# Patient Record
Sex: FEMALE | Race: WHITE | NOT HISPANIC OR LATINO | Employment: UNEMPLOYED | URBAN - METROPOLITAN AREA
[De-identification: names, ages, dates, MRNs, and addresses within clinical notes are randomized per-mention and may not be internally consistent; named-entity substitution may affect disease eponyms.]

---

## 2017-03-20 ENCOUNTER — TELEPHONE (OUTPATIENT)
Dept: ORTHOPEDIC SURGERY | Facility: CLINIC | Age: 57
End: 2017-03-20

## 2017-03-20 DIAGNOSIS — IMO0002 PROPHYLACTIC ANTIBIOTIC FOR DENTAL PROCEDURE INDICATED DUE TO PRIOR JOINT REPLACEMENT: Primary | ICD-10-CM

## 2017-03-20 RX ORDER — CEPHALEXIN 500 MG/1
CAPSULE ORAL
Qty: 4 CAPSULE | Refills: 2 | Status: SHIPPED | OUTPATIENT
Start: 2017-03-20 | End: 2017-08-17 | Stop reason: SDUPTHER

## 2017-03-20 NOTE — TELEPHONE ENCOUNTER
Have E prescribed a new prescription to [facility] at [phone] for Keflex 500 mg, #4,Take 4 caps 1 hour prior to Dental procedure.  Refill ×2 per [facility]

## 2017-03-21 ENCOUNTER — TELEPHONE (OUTPATIENT)
Dept: ORTHOPEDIC SURGERY | Facility: CLINIC | Age: 57
End: 2017-03-21

## 2017-03-21 NOTE — TELEPHONE ENCOUNTER
Her dentist will need to be the one that determines if she needs antibiotics to take between now and her appt. Does still need to take the prophylactic antibiotic RX 1 hour prior to visit.  AMB

## 2017-05-08 ENCOUNTER — TELEPHONE (OUTPATIENT)
Dept: ORTHOPEDIC SURGERY | Facility: CLINIC | Age: 57
End: 2017-05-08

## 2017-06-29 ENCOUNTER — OFFICE VISIT (OUTPATIENT)
Dept: ORTHOPEDIC SURGERY | Facility: CLINIC | Age: 57
End: 2017-06-29

## 2017-06-29 VITALS — WEIGHT: 140 LBS | BODY MASS INDEX: 26.43 KG/M2 | HEIGHT: 61 IN | TEMPERATURE: 97.4 F

## 2017-06-29 DIAGNOSIS — G89.29 CHRONIC PAIN OF LEFT KNEE: Primary | ICD-10-CM

## 2017-06-29 DIAGNOSIS — M25.562 CHRONIC PAIN OF LEFT KNEE: Primary | ICD-10-CM

## 2017-06-29 DIAGNOSIS — M17.12 PRIMARY OSTEOARTHRITIS OF LEFT KNEE: ICD-10-CM

## 2017-06-29 PROCEDURE — 73562 X-RAY EXAM OF KNEE 3: CPT | Performed by: ORTHOPAEDIC SURGERY

## 2017-06-29 PROCEDURE — 99214 OFFICE O/P EST MOD 30 MIN: CPT | Performed by: ORTHOPAEDIC SURGERY

## 2017-06-29 RX ORDER — CEFAZOLIN SODIUM 2 G/100ML
2 INJECTION, SOLUTION INTRAVENOUS ONCE
Status: CANCELLED | OUTPATIENT
Start: 2017-08-25 | End: 2017-06-29

## 2017-06-29 RX ORDER — MELOXICAM 7.5 MG/1
15 TABLET ORAL ONCE
Status: CANCELLED | OUTPATIENT
Start: 2017-08-25 | End: 2017-06-29

## 2017-06-29 RX ORDER — PREGABALIN 75 MG/1
150 CAPSULE ORAL ONCE
Status: CANCELLED | OUTPATIENT
Start: 2017-08-25 | End: 2017-06-29

## 2017-06-29 RX ORDER — VANCOMYCIN HYDROCHLORIDE 1 G/200ML
15 INJECTION, SOLUTION INTRAVENOUS ONCE
Status: CANCELLED | OUTPATIENT
Start: 2017-08-25 | End: 2017-06-29

## 2017-06-29 NOTE — PROGRESS NOTES
Patient: Yeny Anthony  YOB: 1960 56 y.o. female  Medical Record Number: [id number]    Chief Complaints:   Chief Complaint   Patient presents with   • Left Knee - Pain, Follow-up       History of Present Illness:Yeny Anthony is a 56 y.o. female who presents for follow-up of  Left medial knee pain which she rates as severe and constant.  It is a stabbing aching pain worse with weightbearing.  Has had previous injections and physical therapy as well as taking anti-inflammatories without any symptoms.  Only walk short distances and has limitations of basic activities of daily living.    Allergies:   Allergies   Allergen Reactions   • Adhesive Tape Itching   • Codeine Itching       Medications:   Current Outpatient Prescriptions   Medication Sig Dispense Refill   • ALLEGRA-D ALLERGY & CONGESTION 180-240 MG per 24 hr tablet TK 1 T PO D  1   • buPROPion SR (WELLBUTRIN SR) 150 MG 12 hr tablet Take 150 mg by mouth 2 (two) times a day as needed.     • calcium carbonate-vitamin d 600-400 MG-UNIT per tablet TK 1 T PO BID  3   • cephalexin (KEFLEX) 500 MG capsule Take all 4 capsules 1 hour prior to procedure 4 capsule 2   • cephalexin (KEFLEX) 500 MG capsule Take all 4 caps 1 hour prior to procedure 4 capsule 2   • docusate sodium 100 MG capsule Take 100 mg by mouth 2 (two) times a day as needed for constipation for up to 30 doses. 30 capsule 0   • HYDROcodone-acetaminophen (NORCO) 7.5-325 MG per tablet Take 1-2 po q 4-6 hr prn pain 75 tablet 0   • pantoprazole (PROTONIX) 40 MG EC tablet Take 40 mg by mouth every morning.  1   • pravastatin (PRAVACHOL) 10 MG tablet 10 mg po nightly  1     No current facility-administered medications for this visit.          The following portions of the patient's history were reviewed and updated as appropriate: allergies, current medications, past family history, past medical history, past social history, past surgical history and problem list.    Review of Systems:  "  A 14 point review of systems was performed. All systems negative except pertinent positives/negative listed in HPI above    Physical Exam:   Vitals:    06/29/17 1546   Temp: 97.4 °F (36.3 °C)   Weight: 140 lb (63.5 kg)   Height: 61\" (154.9 cm)       General: A and O x 3, ASA, NAD    SCLERA:    Normal    DENTITION:   Normal  Knee:  left    ALIGNMENT:     Varus  ,   Patella  tracks  midline without crepitance    GAIT:    Antalgic    SKIN:    No abnormality    RANGE OF MOTION:   3  -  125   DEG    STRENGTH:   4  / 5    LIGAMENTS:    No varus / valgus instability.   Negative  Lachman.    MENISCUS:     Negative   Darren       DISTAL PULSES:    Paplable    DISTAL SENSATION :   Intact    LYMPHATICS:     No   lymphadenopathy    OTHER:          - Positive   effusion      - Crepitance with ROM       Radiology:  Xrays 3views left knee (ap,lateral, sunrise) were ordered and reviewed for evaluation of knee pain demonstratingadvanced varus osteoarthritis with bone on bone articulation, subchondral cysts, and periarticular osteophytes- isolated to medial compartment  todays xrays were compared to previous xrays and show new findings as described above    Assessment/Plan:  Left knee isolated medial compartment end-stage osteoarthritis.  She has failed the full course of conservative measures.  She is undergone right knee unicompartmental replacement with excellent success.  Continuation of conservative management vs. UKA vs TKA discussed. After stating understanding of the procedures and associated risks / benefit / alternatives of the various options,  the patient wishes to proceed with unicompartmental knee replacement.  At this point the patient has failed the full gamut of conservative treatment and stating complete understanding of the risks / benefits / anternatives wishes to proceed with surgical treatment.    The patient understands that no guarantees have been made with regard to outcomes of this procedure and that " there is a possibility that future surgery is a possibility including conversion to total knee replacement should further disease progression occur in other compartments of the knee.    Risk and benefits of surgery were reviewed.  Including, but not limited to, blood clots or pulmonary embolism, anesthesia risk, infection, fracture, skin/leg numbness, persistent pain/crepitance/popping/catching, failure of the implant, need for future surgeries, hematoma, possible nerve or blood vessel injury, need for transfusion, and potential risk of stroke,heart attack or death, among others.  The patient understands and wishes to proceed.     It was explained that if tissue has been repaired or reconstructed, there is also an increased chance of failure which may require further management.  Following the completion of the discussion, the patient expressed understanding of this planned course of care, all their questions were answered and consent will be obtained preoperatively.    Operative Plan:  Left [facility] and Nephew/ Leanna unicompartmental knee replacement performing the procedure on an outpatient basiswith home health rehab

## 2017-07-06 ENCOUNTER — TELEPHONE (OUTPATIENT)
Dept: ORTHOPEDIC SURGERY | Facility: CLINIC | Age: 57
End: 2017-07-06

## 2017-07-06 NOTE — TELEPHONE ENCOUNTER
----- Message from Yeny Anthony sent at 7/5/2017  8:54 PM EDT -----  Regarding: Visit Follow-Up Question    Contact: [phone]  MY CHART MESSAGE:    Hello  I need to schedule my uni compartmental knee replacement for my left knee this coming August somewhere around the 21st. Can I set that up as well as my pre surgical visit?

## 2017-07-07 NOTE — TELEPHONE ENCOUNTER
It looks like a case request was placed on 6/29/17.  Has this been taken care of?  Has she been called to set up surgery and her presurgical visit?

## 2017-08-14 ENCOUNTER — APPOINTMENT (OUTPATIENT)
Dept: PREADMISSION TESTING | Facility: HOSPITAL | Age: 57
End: 2017-08-14

## 2017-08-17 ENCOUNTER — OFFICE VISIT (OUTPATIENT)
Dept: ORTHOPEDIC SURGERY | Facility: CLINIC | Age: 57
End: 2017-08-17

## 2017-08-17 ENCOUNTER — APPOINTMENT (OUTPATIENT)
Dept: PREADMISSION TESTING | Facility: HOSPITAL | Age: 57
End: 2017-08-17

## 2017-08-17 VITALS
DIASTOLIC BLOOD PRESSURE: 75 MMHG | TEMPERATURE: 98.2 F | RESPIRATION RATE: 16 BRPM | SYSTOLIC BLOOD PRESSURE: 121 MMHG | OXYGEN SATURATION: 99 % | WEIGHT: 145 LBS | HEART RATE: 78 BPM | HEIGHT: 61 IN | BODY MASS INDEX: 27.38 KG/M2

## 2017-08-17 VITALS
SYSTOLIC BLOOD PRESSURE: 140 MMHG | HEIGHT: 61 IN | DIASTOLIC BLOOD PRESSURE: 80 MMHG | BODY MASS INDEX: 27.38 KG/M2 | WEIGHT: 145 LBS | TEMPERATURE: 98.4 F

## 2017-08-17 DIAGNOSIS — G89.29 CHRONIC PAIN OF LEFT KNEE: ICD-10-CM

## 2017-08-17 DIAGNOSIS — M25.562 CHRONIC PAIN OF LEFT KNEE: ICD-10-CM

## 2017-08-17 DIAGNOSIS — M17.12 PRIMARY OSTEOARTHRITIS OF LEFT KNEE: Primary | ICD-10-CM

## 2017-08-17 LAB
ANION GAP SERPL CALCULATED.3IONS-SCNC: 14 MMOL/L
BILIRUB UR QL STRIP: NEGATIVE
BUN BLD-MCNC: 12 MG/DL (ref 6–20)
BUN/CREAT SERPL: 17.1 (ref 7–25)
CALCIUM SPEC-SCNC: 9 MG/DL (ref 8.6–10.5)
CHLORIDE SERPL-SCNC: 104 MMOL/L (ref 98–107)
CLARITY UR: CLEAR
CO2 SERPL-SCNC: 24 MMOL/L (ref 22–29)
COLOR UR: YELLOW
CREAT BLD-MCNC: 0.7 MG/DL (ref 0.57–1)
DEPRECATED RDW RBC AUTO: 45.6 FL (ref 37–54)
ERYTHROCYTE [DISTWIDTH] IN BLOOD BY AUTOMATED COUNT: 14.5 % (ref 11.7–13)
GFR SERPL CREATININE-BSD FRML MDRD: 86 ML/MIN/1.73
GLUCOSE BLD-MCNC: 102 MG/DL (ref 65–99)
GLUCOSE UR STRIP-MCNC: NEGATIVE MG/DL
HCT VFR BLD AUTO: 39.9 % (ref 35.6–45.5)
HGB BLD-MCNC: 13.2 G/DL (ref 11.9–15.5)
HGB UR QL STRIP.AUTO: NEGATIVE
KETONES UR QL STRIP: NEGATIVE
LEUKOCYTE ESTERASE UR QL STRIP.AUTO: NEGATIVE
MCH RBC QN AUTO: 28.6 PG (ref 26.9–32)
MCHC RBC AUTO-ENTMCNC: 33.1 G/DL (ref 32.4–36.3)
MCV RBC AUTO: 86.6 FL (ref 80.5–98.2)
NITRITE UR QL STRIP: NEGATIVE
PH UR STRIP.AUTO: 7.5 [PH] (ref 5–8)
PLATELET # BLD AUTO: 309 10*3/MM3 (ref 140–500)
PMV BLD AUTO: 8.9 FL (ref 6–12)
POTASSIUM BLD-SCNC: 3.9 MMOL/L (ref 3.5–5.2)
PROT UR QL STRIP: NEGATIVE
RBC # BLD AUTO: 4.61 10*6/MM3 (ref 3.9–5.2)
SODIUM BLD-SCNC: 142 MMOL/L (ref 136–145)
SP GR UR STRIP: 1.02 (ref 1–1.03)
UROBILINOGEN UR QL STRIP: NORMAL
WBC NRBC COR # BLD: 6.11 10*3/MM3 (ref 4.5–10.7)

## 2017-08-17 PROCEDURE — 81003 URINALYSIS AUTO W/O SCOPE: CPT | Performed by: ORTHOPAEDIC SURGERY

## 2017-08-17 PROCEDURE — 77077 JOINT SURVEY SINGLE VIEW: CPT | Performed by: NURSE PRACTITIONER

## 2017-08-17 PROCEDURE — 80048 BASIC METABOLIC PNL TOTAL CA: CPT | Performed by: ORTHOPAEDIC SURGERY

## 2017-08-17 PROCEDURE — S0260 H&P FOR SURGERY: HCPCS | Performed by: NURSE PRACTITIONER

## 2017-08-17 PROCEDURE — 93010 ELECTROCARDIOGRAM REPORT: CPT | Performed by: INTERNAL MEDICINE

## 2017-08-17 PROCEDURE — 36415 COLL VENOUS BLD VENIPUNCTURE: CPT

## 2017-08-17 PROCEDURE — 93005 ELECTROCARDIOGRAM TRACING: CPT

## 2017-08-17 PROCEDURE — 85027 COMPLETE CBC AUTOMATED: CPT | Performed by: ORTHOPAEDIC SURGERY

## 2017-08-17 RX ORDER — ACETAMINOPHEN 500 MG
500 TABLET ORAL EVERY 6 HOURS PRN
COMMUNITY
End: 2017-08-25 | Stop reason: HOSPADM

## 2017-08-17 RX ORDER — PRAVASTATIN SODIUM 10 MG
10 TABLET ORAL NIGHTLY
COMMUNITY

## 2017-08-17 RX ORDER — FEXOFENADINE HCL 180 MG/1
180 TABLET ORAL DAILY
COMMUNITY
End: 2018-01-03

## 2017-08-17 NOTE — H&P
Patient: Yeny Anthony    Date of Admission: 8/25/17    YOB: 1960    Medical Record Number: [id number]    Admitting Physician: Dr. Gamal Correa    Reason for Admission: End Stage Left Medial Knee OA    History of Present Illness: 56 y.o. female presents with severe end stage medial compartment knee osteoarthritis which has not been responsive to the full compliment of conservative measures. Despite conservative attempts, there is still severe, constant activity limiting pain. Given the severity of the pain, the patient has elected to proceed with knee replacement.    Allergies:   Allergies   Allergen Reactions   • Adhesive Tape Itching   • Codeine Itching         Current Medications:  Scheduled Meds:  PRN Meds:.    PMH:  Past Medical History:   Diagnosis Date   • Acid reflux    • Bundle branch block    • High cholesterol    • History of migraine    • RHA (rheumatoid arthritis)    • Seasonal affective disorder    • Sleep apnea     mild   • Spinal headache     chavez monae fusion lumbar are  for delivery of child anesthesia attempted epidural above the fusion and resulted  in spinal headache   • Toxic condition due to an overly active thyroid gland        PSurg/Soc/Fam Hx:     Past Surgical History:   Procedure Laterality Date   • BREAST ABSCESS INCISION AND DRAINAGE     • KNEE ARTHROPLASTY UNICOMPARTMENTAL Right 6/24/2016    Procedure: RT KNEE ARTHROPLASTY UNICOMPARTMENTAL;  Surgeon: Gamal Correa MD;  Location: [facility] OR;  Service:    • KNEE SURGERY Left     arthroscopic   • SPINE SURGERY      chavez monae fusion         Social History     Occupational History   • Not on file.     Social History Main Topics   • Smoking status: Never Smoker   • Smokeless tobacco: Never Used   • Alcohol use No   • Drug use: No   • Sexual activity: Defer      Social History     Social History Narrative      History reviewed. No pertinent family history.      Review of Systems:   A 14 point review of systems  "was performed, pertinent positives discussed above, all other systems are negative    Physical Exam: 56 y.o. female  Vital Signs :   Vitals:    08/17/17 1357   BP: 140/80   BP Location: Left arm   Patient Position: Sitting   Cuff Size: Adult   Temp: 98.4 °F (36.9 °C)   TempSrc: Temporal Artery    Weight: 145 lb (65.8 kg)   Height: 61\" (154.9 cm)     General: Alert and Oriented x 3, No acute distress.  Psych: mood and affect appropriate; recent and remote memory intact  Eyes: conjunctiva clear; pupils equally round and reactive, sclera nonicteric  CV: no peripheral edema  Resp: normal respiratory effort  Skin: no rashes or wounds; normal turgor    Xrays:  -3 views (AP, lateral, and sunrise) were reviewed demonstrating end-stage isolated medial compartment OA with medial bone on bone articulation. The lateral and patellofemoral compartments are well preserved.  -A full length AP xray was ordered today for purposes of operative alignment demonstrating end stage medial compartment arthritic findings. There are no previous full length films for review    Assessment:  End-stage Left knee medial compartment osteoarthritis. Conservative measures have failed.      Plan:  The plan is to proceed with Left Unicompartmental Knee Replacement possible Total Knee Replacement. The patient voiced understanding of the risks, benefits, and alternative forms of treatment that were discussed with Dr Correa at the time of scheduling. Patient is planning on going home with home health same day.  In addition she apparently had oral surgery little over 2 weeks ago in which she had a tooth pulled and was placed on antibiotics, but no infection that she is aware of.  She will contact the oral surgeon as soon as she leaves here to have them clear her for partial knee replacement.    Roshni Alfaro, APRN  8/17/2017  "

## 2017-08-17 NOTE — DISCHARGE INSTRUCTIONS
Take the following medications the morning of surgery with a small sip of water:    NONE    General Instructions:  • Do not eat solid food after midnight the night before surgery.  • You may drink clear liquids day of surgery but must stop at least one hour before your hospital arrival time.  • It is beneficial for you to have a clear drink that contains carbohydrates the day of surgery.  We suggest a 20 ounce bottle of Gatorade or Powerade for non-diabetic patients or a 20 ounce bottle of G2 or Powerade Zero for diabetic patients. (Pediatric patients, are not advised to drink a 20 ounce carbohydrate drink)    Clear liquids are liquids you can see through.  Nothing red in color.     Plain water                               Sports drinks  Sodas                                   Gelatin (Jell-O)  Fruit juices without pulp such as white grape juice and apple juice  Popsicles that contain no fruit or yogurt  Tea or coffee (no cream or milk added)  Gatorade / Powerade  G2 / Powerade Zero    • Infants may have breast milk up to four hours before surgery.  • Infants drinking formula may drink formula up to six hours before surgery.   • Patients who avoid smoking, chewing tobacco and alcohol for 4 weeks prior to surgery have a reduced risk of post-operative complications.  Quit smoking as many days before surgery as you can.  • Do not smoke, use chewing tobacco or drink alcohol the day of surgery.   • If applicable bring your C-PAP/ BI-PAP machine.  • Bring any papers given to you in the doctor’s office.  • Wear clean comfortable clothes and socks.  • Do not wear contact lenses or make-up.  Bring a case for your glasses.   • Bring crutches or walker if applicable.  • Leave all other valuables and jewelry at home.  • The Pre-Admission Testing nurse will instruct you to bring medications if unable to obtain an accurate list in Pre-Admission Testing.        If you were given a blood bank ID arm band remember to bring it with  you the day of surgery.    Preventing a Surgical Site Infection:  • For 2 to 3 days before surgery, avoid shaving with a razor because the razor can irritate skin and make it easier to develop an infection.  • The night prior to surgery sleep in a clean bed with clean clothing.  Do not allow pets to sleep with you.  • Shower on the morning of surgery using a fresh bar of anti-bacterial soap (such as Dial) and clean washcloth.  Dry with a clean towel and dress in clean clothing.  • Ask your surgeon if you will be receiving antibiotics prior to surgery.  • Make sure you, your family, and all healthcare providers clean their hands with soap and water or an alcohol based hand  before caring for you or your wound.    Day of surgery:  Upon arrival, a Pre-op nurse and Anesthesiologist will review your health history, obtain vital signs, and answer questions you may have.  The only belongings needed at this time will be your home medications and if applicable your C-PAP/BI-PAP machine.  If you are staying overnight your family can leave the rest of your belongings in the car and bring them to your room later.  A Pre-op nurse will start an IV and you may receive medication in preparation for surgery, including something to help you relax.  Your family will be able to see you in the Pre-op area.  While you are in surgery your family should notify the waiting room  if they leave the waiting room area and provide a contact phone number.    Please be aware that surgery does come with discomfort.  We want to make every effort to control your discomfort so please discuss any uncontrolled symptoms with your nurse.   Your doctor will most likely have prescribed pain medications.      If you are going home after surgery you will receive individualized written care instructions before being discharged.  A responsible adult must drive you to and from the hospital on the day of your surgery and stay with you for 24  hours.    If you are staying overnight following surgery, you will be transported to your hospital room following the recovery period.  [facility] has all private rooms.    If you have any questions please call Pre-Admission Testing at [phone].  Deductibles and co-payments are collected on the day of service. Please be prepared to pay the required co-pay, deductible or deposit on the day of service as defined by your plan.Take the following medications the morning of surgery with a small sip of water:      2% CHLORAHEXIDINE GLUCONATE* CLOTH  Preparing or “prepping” skin before surgery can reduce the risk of infection at the surgical site. To make the process easier, [facility] has chosen disposable cloths moistened with a rinse-free, 2% Chlorhexidine Gluconate (CHG) antiseptic solution. The steps below outline the prepping process and should be carefully followed.        Use the prep cloth on the area that is circled in the diagram             Directions Night before Surgery  1) Shower using a fresh bar of anti-bacterial soap (such as Dial) and clean washcloth.  Use a clean towel to completely dry your skin.  2) Do not use any lotions, oils or creams on your skin.  3) Open the package and remove 1 cloth, wipe your skin for 30 seconds in a circular motion.  Allow to dry for 3 minutes.  4) Repeat #3 with second cloth.  5) Do not touch your eyes, ears, or mouth with the prep cloth.  6) Allow the wet prep solution to air dry.  7) Discard the prep cloth and wash your hands with soap and water.   8) Dress in clean bed clothes and sleep on fresh clean bed sheets.   9) You may experience some temporary itching after the prep.    Directions Day of Surgery  1) Repeat steps 1,2,3,4,5,6,7, and 9.   2) Dress in clean clothes before coming to the hospital.    BACTROBAN NASAL OINTMENT  There are many germs normally in your nose. Bactroban is an ointment that will help reduce these germs. Please  follow these instructions for Bactroban use:    ____Two days before surgery in the evening Date________    ____The day before surgery in the morning  Date________    ____The day before surgery in the evening              Date________    ____The day of surgery in the morning    Date________    **Squirt ½ package of Bactroban Ointment onto a cotton applicator and apply to inside of 1st nostril.  Squirt the remaining Bactroban and apply to the inside of the other nostril.

## 2017-08-25 ENCOUNTER — ANESTHESIA (OUTPATIENT)
Dept: PERIOP | Facility: HOSPITAL | Age: 57
End: 2017-08-25

## 2017-08-25 ENCOUNTER — HOSPITAL ENCOUNTER (INPATIENT)
Facility: HOSPITAL | Age: 57
LOS: 1 days | Discharge: HOME HEALTH | End: 2017-08-25
Attending: ORTHOPAEDIC SURGERY | Admitting: ORTHOPAEDIC SURGERY

## 2017-08-25 ENCOUNTER — ANESTHESIA EVENT (OUTPATIENT)
Dept: PERIOP | Facility: HOSPITAL | Age: 57
End: 2017-08-25

## 2017-08-25 VITALS
SYSTOLIC BLOOD PRESSURE: 92 MMHG | OXYGEN SATURATION: 96 % | HEART RATE: 65 BPM | RESPIRATION RATE: 16 BRPM | TEMPERATURE: 97 F | DIASTOLIC BLOOD PRESSURE: 55 MMHG

## 2017-08-25 DIAGNOSIS — M25.562 CHRONIC PAIN OF LEFT KNEE: ICD-10-CM

## 2017-08-25 DIAGNOSIS — G89.29 CHRONIC PAIN OF LEFT KNEE: ICD-10-CM

## 2017-08-25 PROCEDURE — 25010000002 FENTANYL CITRATE (PF) 100 MCG/2ML SOLUTION: Performed by: ANESTHESIOLOGY

## 2017-08-25 PROCEDURE — 27447 TOTAL KNEE ARTHROPLASTY: CPT | Performed by: NURSE PRACTITIONER

## 2017-08-25 PROCEDURE — G8979 MOBILITY GOAL STATUS: HCPCS

## 2017-08-25 PROCEDURE — 25010000002 PHENYLEPHRINE PER 1 ML: Performed by: ANESTHESIOLOGY

## 2017-08-25 PROCEDURE — C1713 ANCHOR/SCREW BN/BN,TIS/BN: HCPCS | Performed by: ORTHOPAEDIC SURGERY

## 2017-08-25 PROCEDURE — 25010000002 KETOROLAC TROMETHAMINE PER 15 MG: Performed by: ORTHOPAEDIC SURGERY

## 2017-08-25 PROCEDURE — 25010000002 HYDROMORPHONE PER 4 MG: Performed by: ANESTHESIOLOGY

## 2017-08-25 PROCEDURE — C1776 JOINT DEVICE (IMPLANTABLE): HCPCS | Performed by: ORTHOPAEDIC SURGERY

## 2017-08-25 PROCEDURE — 0SRD0J9 REPLACEMENT OF LEFT KNEE JOINT WITH SYNTHETIC SUBSTITUTE, CEMENTED, OPEN APPROACH: ICD-10-PCS | Performed by: ORTHOPAEDIC SURGERY

## 2017-08-25 PROCEDURE — 25010000002 MIDAZOLAM PER 1 MG: Performed by: ANESTHESIOLOGY

## 2017-08-25 PROCEDURE — 25010000002 ONDANSETRON PER 1 MG: Performed by: ORTHOPAEDIC SURGERY

## 2017-08-25 PROCEDURE — 25010000002 DEXAMETHASONE PER 1 MG: Performed by: ANESTHESIOLOGY

## 2017-08-25 PROCEDURE — G8978 MOBILITY CURRENT STATUS: HCPCS

## 2017-08-25 PROCEDURE — 97161 PT EVAL LOW COMPLEX 20 MIN: CPT

## 2017-08-25 PROCEDURE — G8980 MOBILITY D/C STATUS: HCPCS

## 2017-08-25 PROCEDURE — 25010000002 VANCOMYCIN PER 500 MG: Performed by: ORTHOPAEDIC SURGERY

## 2017-08-25 PROCEDURE — 25010000003 CEFAZOLIN IN DEXTROSE 2-4 GM/100ML-% SOLUTION: Performed by: ORTHOPAEDIC SURGERY

## 2017-08-25 PROCEDURE — 25010000002 NEOSTIGMINE PER 0.5 MG: Performed by: ANESTHESIOLOGY

## 2017-08-25 PROCEDURE — 97110 THERAPEUTIC EXERCISES: CPT

## 2017-08-25 PROCEDURE — 25010000002 ONDANSETRON PER 1 MG: Performed by: ANESTHESIOLOGY

## 2017-08-25 PROCEDURE — 27447 TOTAL KNEE ARTHROPLASTY: CPT | Performed by: ORTHOPAEDIC SURGERY

## 2017-08-25 PROCEDURE — 25010000002 PROPOFOL 10 MG/ML EMULSION: Performed by: ANESTHESIOLOGY

## 2017-08-25 DEVICE — IMPLANTABLE DEVICE: Type: IMPLANTABLE DEVICE | Site: KNEE | Status: FUNCTIONAL

## 2017-08-25 RX ORDER — HYDROCODONE BITARTRATE AND ACETAMINOPHEN 7.5; 325 MG/1; MG/1
1 TABLET ORAL EVERY 4 HOURS PRN
Status: DISCONTINUED | OUTPATIENT
Start: 2017-08-25 | End: 2017-08-25 | Stop reason: HOSPADM

## 2017-08-25 RX ORDER — LIDOCAINE HYDROCHLORIDE 20 MG/ML
INJECTION, SOLUTION INFILTRATION; PERINEURAL AS NEEDED
Status: DISCONTINUED | OUTPATIENT
Start: 2017-08-25 | End: 2017-08-25 | Stop reason: SURG

## 2017-08-25 RX ORDER — FENTANYL CITRATE 50 UG/ML
50 INJECTION, SOLUTION INTRAMUSCULAR; INTRAVENOUS
Status: DISCONTINUED | OUTPATIENT
Start: 2017-08-25 | End: 2017-08-25 | Stop reason: HOSPADM

## 2017-08-25 RX ORDER — ONDANSETRON 4 MG/1
4 TABLET, FILM COATED ORAL EVERY 6 HOURS PRN
Status: DISCONTINUED | OUTPATIENT
Start: 2017-08-25 | End: 2017-08-25 | Stop reason: HOSPADM

## 2017-08-25 RX ORDER — MELOXICAM 15 MG/1
15 TABLET ORAL DAILY
Qty: 30 TABLET | Refills: 0 | Status: SHIPPED | OUTPATIENT
Start: 2017-08-25 | End: 2017-09-21 | Stop reason: SDUPTHER

## 2017-08-25 RX ORDER — LABETALOL HYDROCHLORIDE 5 MG/ML
5 INJECTION, SOLUTION INTRAVENOUS
Status: DISCONTINUED | OUTPATIENT
Start: 2017-08-25 | End: 2017-08-25 | Stop reason: HOSPADM

## 2017-08-25 RX ORDER — ONDANSETRON 2 MG/ML
4 INJECTION INTRAMUSCULAR; INTRAVENOUS EVERY 6 HOURS PRN
Status: DISCONTINUED | OUTPATIENT
Start: 2017-08-25 | End: 2017-08-25 | Stop reason: HOSPADM

## 2017-08-25 RX ORDER — MELOXICAM 7.5 MG/1
15 TABLET ORAL ONCE
Status: COMPLETED | OUTPATIENT
Start: 2017-08-25 | End: 2017-08-25

## 2017-08-25 RX ORDER — GLYCOPYRROLATE 0.2 MG/ML
INJECTION INTRAMUSCULAR; INTRAVENOUS AS NEEDED
Status: DISCONTINUED | OUTPATIENT
Start: 2017-08-25 | End: 2017-08-25 | Stop reason: SURG

## 2017-08-25 RX ORDER — TRANEXAMIC ACID 100 MG/ML
INJECTION, SOLUTION INTRAVENOUS AS NEEDED
Status: DISCONTINUED | OUTPATIENT
Start: 2017-08-25 | End: 2017-08-25 | Stop reason: SURG

## 2017-08-25 RX ORDER — BUPROPION HYDROCHLORIDE 150 MG/1
150 TABLET, EXTENDED RELEASE ORAL DAILY
Status: DISCONTINUED | OUTPATIENT
Start: 2017-08-25 | End: 2017-08-25 | Stop reason: HOSPADM

## 2017-08-25 RX ORDER — HYDROCODONE BITARTRATE AND ACETAMINOPHEN 7.5; 325 MG/1; MG/1
1 TABLET ORAL ONCE AS NEEDED
Status: DISCONTINUED | OUTPATIENT
Start: 2017-08-25 | End: 2017-08-25 | Stop reason: HOSPADM

## 2017-08-25 RX ORDER — HYDROCODONE BITARTRATE AND ACETAMINOPHEN 7.5; 325 MG/1; MG/1
TABLET ORAL
Qty: 75 TABLET | Refills: 0 | Status: SHIPPED | OUTPATIENT
Start: 2017-08-25 | End: 2017-08-30 | Stop reason: SDUPTHER

## 2017-08-25 RX ORDER — MIDAZOLAM HYDROCHLORIDE 1 MG/ML
2 INJECTION INTRAMUSCULAR; INTRAVENOUS
Status: DISCONTINUED | OUTPATIENT
Start: 2017-08-25 | End: 2017-08-25 | Stop reason: HOSPADM

## 2017-08-25 RX ORDER — NALOXONE HCL 0.4 MG/ML
0.2 VIAL (ML) INJECTION AS NEEDED
Status: DISCONTINUED | OUTPATIENT
Start: 2017-08-25 | End: 2017-08-25 | Stop reason: HOSPADM

## 2017-08-25 RX ORDER — DEXAMETHASONE SODIUM PHOSPHATE 10 MG/ML
INJECTION INTRAMUSCULAR; INTRAVENOUS AS NEEDED
Status: DISCONTINUED | OUTPATIENT
Start: 2017-08-25 | End: 2017-08-25 | Stop reason: SURG

## 2017-08-25 RX ORDER — HYDROMORPHONE HCL 110MG/55ML
PATIENT CONTROLLED ANALGESIA SYRINGE INTRAVENOUS AS NEEDED
Status: DISCONTINUED | OUTPATIENT
Start: 2017-08-25 | End: 2017-08-25 | Stop reason: SURG

## 2017-08-25 RX ORDER — PROMETHAZINE HYDROCHLORIDE 25 MG/1
12.5 TABLET ORAL ONCE AS NEEDED
Status: DISCONTINUED | OUTPATIENT
Start: 2017-08-25 | End: 2017-08-25 | Stop reason: HOSPADM

## 2017-08-25 RX ORDER — CEFAZOLIN SODIUM 2 G/100ML
2 INJECTION, SOLUTION INTRAVENOUS ONCE
Status: COMPLETED | OUTPATIENT
Start: 2017-08-25 | End: 2017-08-25

## 2017-08-25 RX ORDER — VANCOMYCIN HYDROCHLORIDE 1 G/200ML
15 INJECTION, SOLUTION INTRAVENOUS ONCE
Status: COMPLETED | OUTPATIENT
Start: 2017-08-25 | End: 2017-08-25

## 2017-08-25 RX ORDER — FLUMAZENIL 0.1 MG/ML
0.2 INJECTION INTRAVENOUS AS NEEDED
Status: DISCONTINUED | OUTPATIENT
Start: 2017-08-25 | End: 2017-08-25 | Stop reason: HOSPADM

## 2017-08-25 RX ORDER — PROPOFOL 10 MG/ML
VIAL (ML) INTRAVENOUS AS NEEDED
Status: DISCONTINUED | OUTPATIENT
Start: 2017-08-25 | End: 2017-08-25 | Stop reason: SURG

## 2017-08-25 RX ORDER — BUPIVACAINE HYDROCHLORIDE 5 MG/ML
INJECTION, SOLUTION EPIDURAL; INTRACAUDAL AS NEEDED
Status: DISCONTINUED | OUTPATIENT
Start: 2017-08-25 | End: 2017-08-25 | Stop reason: SURG

## 2017-08-25 RX ORDER — DIPHENHYDRAMINE HYDROCHLORIDE 50 MG/ML
12.5 INJECTION INTRAMUSCULAR; INTRAVENOUS
Status: DISCONTINUED | OUTPATIENT
Start: 2017-08-25 | End: 2017-08-25 | Stop reason: HOSPADM

## 2017-08-25 RX ORDER — MIDAZOLAM HYDROCHLORIDE 1 MG/ML
1 INJECTION INTRAMUSCULAR; INTRAVENOUS
Status: DISCONTINUED | OUTPATIENT
Start: 2017-08-25 | End: 2017-08-25 | Stop reason: HOSPADM

## 2017-08-25 RX ORDER — PROMETHAZINE HYDROCHLORIDE 25 MG/ML
12.5 INJECTION, SOLUTION INTRAMUSCULAR; INTRAVENOUS ONCE AS NEEDED
Status: DISCONTINUED | OUTPATIENT
Start: 2017-08-25 | End: 2017-08-25 | Stop reason: HOSPADM

## 2017-08-25 RX ORDER — ASPIRIN 325 MG
325 TABLET, DELAYED RELEASE (ENTERIC COATED) ORAL 2 TIMES DAILY
Status: DISCONTINUED | OUTPATIENT
Start: 2017-08-25 | End: 2017-08-25 | Stop reason: HOSPADM

## 2017-08-25 RX ORDER — CEFAZOLIN SODIUM 2 G/100ML
2 INJECTION, SOLUTION INTRAVENOUS EVERY 8 HOURS
Status: COMPLETED | OUTPATIENT
Start: 2017-08-25 | End: 2017-08-25

## 2017-08-25 RX ORDER — OXYCODONE AND ACETAMINOPHEN 7.5; 325 MG/1; MG/1
1 TABLET ORAL ONCE AS NEEDED
Status: DISCONTINUED | OUTPATIENT
Start: 2017-08-25 | End: 2017-08-25 | Stop reason: HOSPADM

## 2017-08-25 RX ORDER — HYDROMORPHONE HYDROCHLORIDE 1 MG/ML
0.5 INJECTION, SOLUTION INTRAMUSCULAR; INTRAVENOUS; SUBCUTANEOUS
Status: DISCONTINUED | OUTPATIENT
Start: 2017-08-25 | End: 2017-08-25 | Stop reason: HOSPADM

## 2017-08-25 RX ORDER — KETOROLAC TROMETHAMINE 30 MG/ML
30 INJECTION, SOLUTION INTRAMUSCULAR; INTRAVENOUS EVERY 8 HOURS
Status: DISCONTINUED | OUTPATIENT
Start: 2017-08-25 | End: 2017-08-25 | Stop reason: HOSPADM

## 2017-08-25 RX ORDER — PROMETHAZINE HYDROCHLORIDE 25 MG/1
25 SUPPOSITORY RECTAL ONCE AS NEEDED
Status: DISCONTINUED | OUTPATIENT
Start: 2017-08-25 | End: 2017-08-25 | Stop reason: HOSPADM

## 2017-08-25 RX ORDER — SODIUM CHLORIDE, SODIUM LACTATE, POTASSIUM CHLORIDE, CALCIUM CHLORIDE 600; 310; 30; 20 MG/100ML; MG/100ML; MG/100ML; MG/100ML
9 INJECTION, SOLUTION INTRAVENOUS CONTINUOUS
Status: DISCONTINUED | OUTPATIENT
Start: 2017-08-25 | End: 2017-08-25 | Stop reason: HOSPADM

## 2017-08-25 RX ORDER — ROCURONIUM BROMIDE 10 MG/ML
INJECTION, SOLUTION INTRAVENOUS AS NEEDED
Status: DISCONTINUED | OUTPATIENT
Start: 2017-08-25 | End: 2017-08-25 | Stop reason: SURG

## 2017-08-25 RX ORDER — SODIUM CHLORIDE 0.9 % (FLUSH) 0.9 %
1-10 SYRINGE (ML) INJECTION AS NEEDED
Status: DISCONTINUED | OUTPATIENT
Start: 2017-08-25 | End: 2017-08-25 | Stop reason: HOSPADM

## 2017-08-25 RX ORDER — PREGABALIN 75 MG/1
150 CAPSULE ORAL ONCE
Status: COMPLETED | OUTPATIENT
Start: 2017-08-25 | End: 2017-08-25

## 2017-08-25 RX ORDER — EPHEDRINE SULFATE 50 MG/ML
INJECTION, SOLUTION INTRAVENOUS AS NEEDED
Status: DISCONTINUED | OUTPATIENT
Start: 2017-08-25 | End: 2017-08-25 | Stop reason: SURG

## 2017-08-25 RX ORDER — ONDANSETRON 2 MG/ML
INJECTION INTRAMUSCULAR; INTRAVENOUS AS NEEDED
Status: DISCONTINUED | OUTPATIENT
Start: 2017-08-25 | End: 2017-08-25 | Stop reason: SURG

## 2017-08-25 RX ORDER — MELOXICAM 15 MG/1
15 TABLET ORAL DAILY
Status: DISCONTINUED | OUTPATIENT
Start: 2017-08-25 | End: 2017-08-25 | Stop reason: HOSPADM

## 2017-08-25 RX ORDER — MAGNESIUM HYDROXIDE 1200 MG/15ML
LIQUID ORAL AS NEEDED
Status: DISCONTINUED | OUTPATIENT
Start: 2017-08-25 | End: 2017-08-25 | Stop reason: HOSPADM

## 2017-08-25 RX ORDER — FAMOTIDINE 10 MG/ML
20 INJECTION, SOLUTION INTRAVENOUS ONCE
Status: COMPLETED | OUTPATIENT
Start: 2017-08-25 | End: 2017-08-25

## 2017-08-25 RX ORDER — PROMETHAZINE HYDROCHLORIDE 25 MG/1
25 TABLET ORAL ONCE AS NEEDED
Status: DISCONTINUED | OUTPATIENT
Start: 2017-08-25 | End: 2017-08-25 | Stop reason: HOSPADM

## 2017-08-25 RX ORDER — DOCUSATE SODIUM 100 MG/1
100 CAPSULE, LIQUID FILLED ORAL 2 TIMES DAILY
Status: DISCONTINUED | OUTPATIENT
Start: 2017-08-25 | End: 2017-08-25 | Stop reason: HOSPADM

## 2017-08-25 RX ORDER — EPHEDRINE SULFATE 50 MG/ML
5 INJECTION, SOLUTION INTRAVENOUS ONCE AS NEEDED
Status: DISCONTINUED | OUTPATIENT
Start: 2017-08-25 | End: 2017-08-25 | Stop reason: HOSPADM

## 2017-08-25 RX ORDER — PSEUDOEPHEDRINE HCL 30 MG
100 TABLET ORAL 2 TIMES DAILY
Qty: 25 CAPSULE | Refills: 0 | Status: SHIPPED | OUTPATIENT
Start: 2017-08-25 | End: 2017-09-08

## 2017-08-25 RX ORDER — POLYETHYLENE GLYCOL 3350 17 G/17G
17 POWDER, FOR SOLUTION ORAL 2 TIMES DAILY
Qty: 476 G | Refills: 0 | Status: SHIPPED | OUTPATIENT
Start: 2017-08-25 | End: 2017-09-08

## 2017-08-25 RX ORDER — ACETAMINOPHEN 10 MG/ML
INJECTION, SOLUTION INTRAVENOUS AS NEEDED
Status: DISCONTINUED | OUTPATIENT
Start: 2017-08-25 | End: 2017-08-25 | Stop reason: SURG

## 2017-08-25 RX ORDER — POLYETHYLENE GLYCOL 3350 17 G/17G
17 POWDER, FOR SOLUTION ORAL 2 TIMES DAILY
Status: DISCONTINUED | OUTPATIENT
Start: 2017-08-25 | End: 2017-08-25 | Stop reason: HOSPADM

## 2017-08-25 RX ORDER — HYDROCODONE BITARTRATE AND ACETAMINOPHEN 7.5; 325 MG/1; MG/1
2 TABLET ORAL EVERY 4 HOURS PRN
Status: DISCONTINUED | OUTPATIENT
Start: 2017-08-25 | End: 2017-08-25 | Stop reason: HOSPADM

## 2017-08-25 RX ORDER — PANTOPRAZOLE SODIUM 40 MG/1
40 TABLET, DELAYED RELEASE ORAL EVERY MORNING
Status: DISCONTINUED | OUTPATIENT
Start: 2017-08-25 | End: 2017-08-25 | Stop reason: HOSPADM

## 2017-08-25 RX ORDER — HYDRALAZINE HYDROCHLORIDE 20 MG/ML
5 INJECTION INTRAMUSCULAR; INTRAVENOUS
Status: DISCONTINUED | OUTPATIENT
Start: 2017-08-25 | End: 2017-08-25 | Stop reason: HOSPADM

## 2017-08-25 RX ORDER — ONDANSETRON 4 MG/1
4 TABLET, ORALLY DISINTEGRATING ORAL EVERY 6 HOURS PRN
Status: DISCONTINUED | OUTPATIENT
Start: 2017-08-25 | End: 2017-08-25 | Stop reason: HOSPADM

## 2017-08-25 RX ORDER — ONDANSETRON 2 MG/ML
4 INJECTION INTRAMUSCULAR; INTRAVENOUS ONCE AS NEEDED
Status: DISCONTINUED | OUTPATIENT
Start: 2017-08-25 | End: 2017-08-25 | Stop reason: HOSPADM

## 2017-08-25 RX ORDER — ONDANSETRON 4 MG/1
4 TABLET, FILM COATED ORAL EVERY 6 HOURS PRN
Qty: 10 TABLET | Refills: 0 | Status: SHIPPED | OUTPATIENT
Start: 2017-08-25 | End: 2017-10-20

## 2017-08-25 RX ADMIN — LIDOCAINE HYDROCHLORIDE 50 MG: 20 INJECTION, SOLUTION INFILTRATION; PERINEURAL at 07:08

## 2017-08-25 RX ADMIN — ONDANSETRON 4 MG: 2 INJECTION INTRAMUSCULAR; INTRAVENOUS at 08:04

## 2017-08-25 RX ADMIN — NEOSTIGMINE METHYLSULFATE 3 MG: 1 INJECTION INTRAMUSCULAR; INTRAVENOUS; SUBCUTANEOUS at 08:24

## 2017-08-25 RX ADMIN — FENTANYL CITRATE 50 MCG: 50 INJECTION INTRAMUSCULAR; INTRAVENOUS at 06:43

## 2017-08-25 RX ADMIN — MIDAZOLAM 2 MG: 1 INJECTION INTRAMUSCULAR; INTRAVENOUS at 06:44

## 2017-08-25 RX ADMIN — PROPOFOL 200 MG: 10 INJECTION, EMULSION INTRAVENOUS at 07:08

## 2017-08-25 RX ADMIN — FENTANYL CITRATE: 50 INJECTION INTRAMUSCULAR; INTRAVENOUS at 06:48

## 2017-08-25 RX ADMIN — PHENYLEPHRINE HYDROCHLORIDE 200 MCG: 10 INJECTION INTRAVENOUS at 07:29

## 2017-08-25 RX ADMIN — SODIUM CHLORIDE, POTASSIUM CHLORIDE, SODIUM LACTATE AND CALCIUM CHLORIDE 500 ML: 600; 310; 30; 20 INJECTION, SOLUTION INTRAVENOUS at 05:58

## 2017-08-25 RX ADMIN — CEFAZOLIN SODIUM 2 G: 2 INJECTION, SOLUTION INTRAVENOUS at 07:01

## 2017-08-25 RX ADMIN — ACETAMINOPHEN 1000 MG: 10 INJECTION, SOLUTION INTRAVENOUS at 07:01

## 2017-08-25 RX ADMIN — KETOROLAC TROMETHAMINE 30 MG: 30 INJECTION, SOLUTION INTRAMUSCULAR at 14:50

## 2017-08-25 RX ADMIN — ONDANSETRON 4 MG: 2 INJECTION INTRAMUSCULAR; INTRAVENOUS at 16:31

## 2017-08-25 RX ADMIN — MELOXICAM 15 MG: 15 TABLET ORAL at 14:49

## 2017-08-25 RX ADMIN — CEFAZOLIN SODIUM 2 G: 2 INJECTION, SOLUTION INTRAVENOUS at 14:50

## 2017-08-25 RX ADMIN — TRANEXAMIC ACID 1000 MG: 100 INJECTION, SOLUTION INTRAVENOUS at 08:06

## 2017-08-25 RX ADMIN — PREGABALIN 150 MG: 75 CAPSULE ORAL at 06:10

## 2017-08-25 RX ADMIN — EPHEDRINE SULFATE 10 MG: 50 INJECTION INTRAMUSCULAR; INTRAVENOUS; SUBCUTANEOUS at 07:25

## 2017-08-25 RX ADMIN — DEXAMETHASONE SODIUM PHOSPHATE 8 MG: 10 INJECTION INTRAMUSCULAR; INTRAVENOUS at 07:23

## 2017-08-25 RX ADMIN — HYDROMORPHONE HYDROCHLORIDE 1 MG: 2 INJECTION, SOLUTION INTRAMUSCULAR; INTRAVENOUS; SUBCUTANEOUS at 07:35

## 2017-08-25 RX ADMIN — HYDROCODONE BITARTRATE AND ACETAMINOPHEN 1 TABLET: 7.5; 325 TABLET ORAL at 14:54

## 2017-08-25 RX ADMIN — EPHEDRINE SULFATE 10 MG: 50 INJECTION INTRAMUSCULAR; INTRAVENOUS; SUBCUTANEOUS at 07:20

## 2017-08-25 RX ADMIN — ROCURONIUM BROMIDE 30 MG: 10 INJECTION INTRAVENOUS at 07:08

## 2017-08-25 RX ADMIN — FAMOTIDINE 20 MG: 10 INJECTION INTRAVENOUS at 06:43

## 2017-08-25 RX ADMIN — ONDANSETRON 4 MG: 2 INJECTION INTRAMUSCULAR; INTRAVENOUS at 16:28

## 2017-08-25 RX ADMIN — SODIUM CHLORIDE, POTASSIUM CHLORIDE, SODIUM LACTATE AND CALCIUM CHLORIDE 9 ML/HR: 600; 310; 30; 20 INJECTION, SOLUTION INTRAVENOUS at 06:54

## 2017-08-25 RX ADMIN — ASPIRIN 325 MG: 325 TABLET, DELAYED RELEASE ORAL at 14:49

## 2017-08-25 RX ADMIN — FENTANYL CITRATE 100 MCG: 50 INJECTION INTRAMUSCULAR; INTRAVENOUS at 07:08

## 2017-08-25 RX ADMIN — VANCOMYCIN HYDROCHLORIDE 1000 MG: 1 INJECTION, SOLUTION INTRAVENOUS at 06:11

## 2017-08-25 RX ADMIN — PHENYLEPHRINE HYDROCHLORIDE 200 MCG: 10 INJECTION INTRAVENOUS at 07:50

## 2017-08-25 RX ADMIN — BUPIVACAINE HYDROCHLORIDE 30 ML: 5 INJECTION, SOLUTION EPIDURAL; INTRACAUDAL; PERINEURAL at 06:47

## 2017-08-25 RX ADMIN — MELOXICAM 15 MG: 15 TABLET ORAL at 06:10

## 2017-08-25 RX ADMIN — EPHEDRINE SULFATE 10 MG: 50 INJECTION INTRAMUSCULAR; INTRAVENOUS; SUBCUTANEOUS at 07:16

## 2017-08-25 RX ADMIN — GLYCOPYRROLATE 0.6 MG: 0.2 INJECTION INTRAMUSCULAR; INTRAVENOUS at 08:24

## 2017-08-25 NOTE — ANESTHESIA PROCEDURE NOTES
Airway  Date/Time: 8/25/2017 7:12 AM  Airway not difficult    General Information and Staff    Patient location during procedure: OR  Anesthesiologist: RAJENDRA DODD    Indications and Patient Condition  Indications for airway management: airway protection    Preoxygenated: yes      Final Airway Details  Final airway type: endotracheal airway      Successful airway: ETT  Cuffed: yes   Successful intubation technique: direct laryngoscopy  Endotracheal tube insertion site: oral  Blade: Riri  Blade size: #3  ETT size: 7.0 mm  Cormack-Lehane Classification: grade I - full view of glottis  Placement verified by: chest auscultation and capnometry   Measured from: lips  ETT to lips (cm): 21  Number of attempts at approach: 1    Additional Comments  Atraumatic jessica #3 mac x 1 w ease ebbs tube sec vent cont eyes taped

## 2017-08-25 NOTE — PLAN OF CARE
Problem: Patient Care Overview (Adult)  Goal: Plan of Care Review  Outcome: Ongoing (interventions implemented as appropriate)    08/25/17 1005   Coping/Psychosocial Response Interventions   Plan Of Care Reviewed With patient   Patient Care Overview   Progress no change   Outcome Evaluation   Outcome Summary/Follow up Plan VS stable, pain controlled         Problem: Perioperative Period (Adult)  Goal: Signs and Symptoms of Listed Potential Problems Will be Absent or Manageable (Perioperative Period)  Outcome: Ongoing (interventions implemented as appropriate)    08/25/17 1005   Perioperative Period   Problems Assessed (Perioperative Period) all   Problems Present (Perioperative Period) pain;none

## 2017-08-25 NOTE — PLAN OF CARE
Problem: Patient Care Overview (Adult)  Goal: Plan of Care Review  Outcome: Ongoing (interventions implemented as appropriate)    08/25/17 0611   Coping/Psychosocial Response Interventions   Plan Of Care Reviewed With patient   Patient Care Overview   Progress no change       Goal: Adult Individualization and Mutuality  Outcome: Ongoing (interventions implemented as appropriate)  Goal: Discharge Needs Assessment  Outcome: Ongoing (interventions implemented as appropriate)    Problem: Perioperative Period (Adult)  Goal: Signs and Symptoms of Listed Potential Problems Will be Absent or Manageable (Perioperative Period)  Outcome: Ongoing (interventions implemented as appropriate)

## 2017-08-25 NOTE — ANESTHESIA PROCEDURE NOTES
Peripheral Block    Patient location during procedure: pre-op  Reason for block: at surgeon's request and post-op pain management  Performed by  Anesthesiologist: SUNSHINE BARILLAS  Preanesthetic Checklist  Completed: patient identified, site marked, surgical consent, pre-op evaluation, timeout performed, IV checked, risks and benefits discussed and monitors and equipment checked  Prep:  Pt Position: supine  Sterile barriers:cap, gloves, sterile barriers and mask  Prep: ChloraPrep  Patient monitoring: continuous pulse oximetry, blood pressure monitoring and EKG  Procedure  Sedation:yes    Guidance:ultrasound guided  ULTRASOUND INTERPRETATION. Using ultrasound guidance a gauge needle was placed in close proximity to the femoral nerve, at which point, under ultrasound guidance anesthetic was injected in the area of the nerve and spread of the anesthesia was seen on ultrasound in close proximity thereto.  There were no abnormalities seen on ultrasound; a digital image was taken; and the patient tolerated the procedure with no complications. Images:still images obtained    Laterality:left  Block Type:adductor canal block  Injection Technique:single-shot  Needle Type:echogenic  Needle Gauge:21 G  Resistance on Injection: less than 15 psi  Medications  Local Injected:bupivacaine 0.5% Local Amount Injected:30mL  Post Assessment  Injection Assessment: negative aspiration for heme, no paresthesia on injection and incremental injection  Patient Tolerance:comfortable throughout block  Complications:no

## 2017-08-25 NOTE — PLAN OF CARE
Problem: Patient Care Overview (Adult)  Goal: Plan of Care Review    08/25/17 1523   Coping/Psychosocial Response Interventions   Plan Of Care Reviewed With patient   Outcome Evaluation   Outcome Summary/Follow up Plan Pt. independent with functional mobility and has met all inpt. P.T. goals at this time. Pt. with no further questions/concerns regarding functional mobility, home safety, or H.E.P. Will sign off.

## 2017-08-25 NOTE — PROGRESS NOTES
Continued Stay Note  [facility]     Patient Name: Yeny Anthony  MRN: [id number]  Today's Date: 8/25/2017    Admit Date: 8/25/2017          Discharge Plan       08/25/17 1756    Case Management/Social Work Plan    Plan [facility]    Patient/Family In Agreement With Plan yes    Additional Comments Spoke with pt, verified correct information on facesheet (pt will be staying at [phone]) and explained the role of CCP. Pt would like to d/c home with [facility], referral given to Linda with [facility] who states they are able to accept. Plan will be to d/c home with HH and family support.    Final Note    Final Note Pt d/c'ed home with [facility].              Discharge Codes       08/25/17 1756    Discharge Codes    Discharge Codes 06  Discharged/transferred to home under care of organized home health service organization in anticipation of skilled care        Expected Discharge Date and Time     Expected Discharge Date Expected Discharge Time    Aug 25, 2017             Lisset Yip RN

## 2017-08-25 NOTE — THERAPY DISCHARGE NOTE
Acute Care - Physical Therapy Initial Eval/Discharge  [facility]     Patient Name: Yeny Anthony  : 1960  MRN: [id number]  Today's Date: 2017   Onset of Illness/Injury or Date of Surgery Date: 17  Date of Referral to PT: 17  Referring Physician: Gamal Salter      Admit Date: 2017    Visit Dx:    ICD-10-CM ICD-9-CM   1. Chronic pain of left knee M25.562 719.46    G89.29 338.29     Patient Active Problem List   Diagnosis   • Primary osteoarthritis of right knee   • Chronic pain of left knee     Past Medical History:   Diagnosis Date   • Acid reflux    • Bundle branch block    • Depression    • High cholesterol    • History of migraine    • RHA (rheumatoid arthritis)    • Seasonal affective disorder    • Sleep apnea     mild   • Spinal headache     chavez monae fusion lumbar are  for delivery of child anesthesia attempted epidural above the fusion and resulted  in spinal headache   • Toxic condition due to an overly active thyroid gland      Past Surgical History:   Procedure Laterality Date   • BREAST ABSCESS INCISION AND DRAINAGE     • KNEE ARTHROPLASTY UNICOMPARTMENTAL Right 2016    Procedure: RT KNEE ARTHROPLASTY UNICOMPARTMENTAL;  Surgeon: Gamal Correa MD;  Location: [facility] OR;  Service:    • KNEE SURGERY Left     arthroscopic   • SPINE SURGERY      chavez monae fusion           PT ASSESSMENT (last 72 hours)      PT Evaluation       17 1518 17 0611    Rehab Evaluation    Document Type discharge evaluation/summary  -MS     Subjective Information agree to therapy;complains of;pain  -MS     Patient Effort, Rehab Treatment excellent  -MS     Symptoms Noted Comment Pt. reports minimal Left knee pain and eager to work with P.T. this PM.  -MS     General Information    Onset of Illness/Injury or Date of Surgery Date 17  -MS     Referring Physician Gamal Salter  -MS     Pertinent History Of Current Problem Pt. s/p Left Unicompartmental knee    -MS     Prior Level of Function independent:  -MS     Equipment Currently Used at Home none  -MS     Plans/Goals Discussed With patient;agreed upon  -MS     Risks Reviewed patient:  -MS     Benefits Reviewed patient:  -MS     Barriers to Rehab none identified  -MS     Living Environment    Home Accessibility bed and bath on same level  -MS     Number of Stairs to Enter Home 3   1 HR  -MS     Transportation Available  car;family or friend will provide  -TF    Clinical Impression    Date of Referral to PT 08/25/17  -MS     Criteria for Skilled Therapeutic Interventions Met no problems identified which require skilled intervention   Will follow up with HHPT  -MS     Pain Assessment    Pain Assessment 0-10  -MS     Pain Score 4  -MS     Pain Type Acute pain;Surgical pain  -MS     Pain Location Knee  -MS     Pain Orientation Left  -MS     Pain Intervention(s) Medication (See MAR);Cold applied;Repositioned;Elevated;Rest  -MS     Cognitive Assessment/Intervention    Current Cognitive/Communication Assessment functional  -MS     Follows Commands/Answers Questions 100% of the time  -MS     Personal Safety Interventions fall prevention program maintained;gait belt;nonskid shoes/slippers when out of bed;supervised activity  -MS     ROM (Range of Motion)    General ROM --   BUE/RLE (WFL's)  -MS     General ROM Detail Left knee AROM (5, 113)  -MS     MMT (Manual Muscle Testing)    General MMT Assessment --   BUE/RLE (>/= 3/5)  -MS     Mobility Assessment/Training    Extremity Weight-Bearing Status left lower extremity  -MS     Left Lower Extremity Weight-Bearing weight-bearing as tolerated  -MS     Bed Mobility, Assessment/Treatment    Bed Mob, Supine to Sit, [place] independent  -MS     Bed Mob, Sit to Supine, [place] independent  -MS     Transfer Assessment/Treatment    Transfers, Sit-Stand [place] independent  -MS     Transfers, Stand-Sit [place] independent  -MS     Transfers, Sit-Stand-Sit, Assist Device  rolling walker  -MS     Gait Assessment/Treatment    Gait, [place] Level independent  -MS     Gait, Assistive Device rolling walker  -MS     Gait, Distance (Feet) 200  -MS     Gait, Gait Pattern Analysis swing-through gait  -MS     Gait, Gait Deviations left:;antalgic  -MS     Stairs Assessment/Treatment    Number of Stairs 4  -MS     Stairs, Handrail Location left side (ascending)  -MS     Stairs, [place] Level supervision required  -MS     Therapy Exercises    Exercise Protocols total knee  -MS     Total Knee Exercises left:;10 reps;completed protocol  -MS     Positioning and Restraints    Pre-Treatment Position sitting in chair/recliner  -MS     Post Treatment Position chair  -MS     In Chair notified nsg;reclined;sitting;call light within reach;encouraged to call for assist   All lines intact. Ice pack to Left knee.  -MS       08/25/17 0579       General Information    Equipment Currently Used at Home none  -TF     Living Environment    Lives With child(abhi), dependent  -TF     Living Arrangements house  -TF     Home Accessibility stairs to enter home  -TF     Number of Stairs to Enter Home 3  -TF     Stair Railings at Home outside, present at both sides  -TF     Type of Financial/Environmental Concern none  -TF     Transportation Available car;family or friend will provide  -TF       User Key  (r) = Recorded By, (t) = Taken By, (c) = Cosigned By    Initials Name Provider Type    TF Kyleigh Salomon RN Registered Nurse    MS Iggy Moreno, PT Physical Therapist          Physical Therapy Education     Title: PT OT SLP Therapies (Resolved)     Topic: Physical Therapy (Resolved)     Point: Mobility training (Resolved)    Learning Progress Summary    Learner Readiness Method Response Comment Documented by Status   Patient Acceptance E,MERI PIRES,NR  MS 08/25/17 1523 Done               Point: Home exercise program (Resolved)    Learning Progress Summary    Learner Readiness Method Response Comment Documented  by Status   Patient Acceptance MERI ELLIOTT NR  MS 08/25/17 1523 Done               Point: Body mechanics (Resolved)    Learning Progress Summary    Learner Readiness Method Response Comment Documented by Status   Patient Acceptance MERI ELLIOTT NR  MS 08/25/17 1523 Done               Point: Precautions (Resolved)    Learning Progress Summary    Learner Readiness Method Response Comment Documented by Status   Patient Acceptance MERI ELLIOTT NR  MS 08/25/17 1523 Done                      User Key     Initials Effective Dates Name Provider Type Discipline    MS 12/01/15 -  Iggy Moreno, PT Physical Therapist PT                PT Recommendation and Plan  Anticipated Equipment Needs At Discharge:  (Ordered Rwx for pt. to use at home (via [place]))  Anticipated Discharge Disposition: home with assist, home with home health  Plan of Care Review  Plan Of Care Reviewed With: patient  Outcome Summary/Follow up Plan: Pt. independent with functional mobility and has met all inpt. P.T. goals at this time.  Pt. with no further questions/concerns regarding functional mobility, home safety, or H.E.P.  Will sign off.              Outcome Measures       08/25/17 1500          How much help from another person do you currently need...    Turning from your back to your side while in flat bed without using bedrails? 4  -MS      Moving from lying on back to sitting on the side of a flat bed without bedrails? 4  -MS      Moving to and from a bed to a chair (including a wheelchair)? 4  -MS      Standing up from a chair using your arms (e.g., wheelchair, bedside chair)? 4  -MS      Climbing 3-5 steps with a railing? 4  -MS      To walk in hospital room? 4  -MS      AM-PAC 6 Clicks Score 24  -MS      Functional Assessment    Outcome Measure Options AM-PAC 6 Clicks Basic Mobility (PT)  -MS        User Key  (r) = Recorded By, (t) = Taken By, (c) = Cosigned By    Initials Name Provider Type    MS Iggy Moreno, PT Physical Therapist           Time  Calculation:         PT Charges       08/25/17 1525          Time Calculation    Start Time 1458  -MS      Stop Time 1515  -MS      Time Calculation (min) 17 min  -MS      PT Received On 08/25/17  -MS        User Key  (r) = Recorded By, (t) = Taken By, (c) = Cosigned By    Initials Name Provider Type    MS Iggy Moreno, PT Physical Therapist          Therapy Charges for Today     Code Description Service Date Service Provider Modifiers Qty    67005132356 HC PT MOBILITY CURRENT 8/25/2017 Iggy Moreno, PT GP,  1    20574839845 HC PT MOBILITY PROJECTED 8/25/2017 Iggy Moreno, PT GP,  1    34820459008 HC PT MOBILITY DISCHARGE 8/25/2017 Iggy Moreno, PT GP,  1    42287495346 HC PT EVAL LOW COMPLEXITY 1 8/25/2017 Iggy Moreno, PT GP 1    34873816694 HC PT THER PROC EA 15 MIN 8/25/2017 Iggy Moreno, PT GP 1          PT G-Codes  PT Professional Judgement Used?: Yes  Outcome Measure Options: AM-PAC 6 Clicks Basic Mobility (PT)  Functional Limitation: Mobility: Walking and moving around  Mobility: Walking and Moving Around Current Status (): 0 percent impaired, limited or restricted  Mobility: Walking and Moving Around Goal Status (): 0 percent impaired, limited or restricted  Mobility: Walking and Moving Around Discharge Status (): 0 percent impaired, limited or restricted    PT Discharge Summary  Anticipated Discharge Disposition: home with assist, home with home health  Reason for Discharge: Independent, All goals achieved  Outcomes Achieved: Refer to plan of care for updates on goals achieved  Discharge Destination: Home with assist, Home with home health    Iggy Moreno, PT  8/25/2017

## 2017-08-25 NOTE — H&P (VIEW-ONLY)
Patient: Yeny Anthony    Date of Admission: 8/25/17    YOB: 1960    Medical Record Number: [id number]    Admitting Physician: Dr. Gamal Correa    Reason for Admission: End Stage Left Medial Knee OA    History of Present Illness: 56 y.o. female presents with severe end stage medial compartment knee osteoarthritis which has not been responsive to the full compliment of conservative measures. Despite conservative attempts, there is still severe, constant activity limiting pain. Given the severity of the pain, the patient has elected to proceed with knee replacement.    Allergies:   Allergies   Allergen Reactions   • Adhesive Tape Itching   • Codeine Itching         Current Medications:  Scheduled Meds:  PRN Meds:.    PMH:  Past Medical History:   Diagnosis Date   • Acid reflux    • Bundle branch block    • High cholesterol    • History of migraine    • RHA (rheumatoid arthritis)    • Seasonal affective disorder    • Sleep apnea     mild   • Spinal headache     chavez monae fusion lumbar are  for delivery of child anesthesia attempted epidural above the fusion and resulted  in spinal headache   • Toxic condition due to an overly active thyroid gland        PSurg/Soc/Fam Hx:     Past Surgical History:   Procedure Laterality Date   • BREAST ABSCESS INCISION AND DRAINAGE     • KNEE ARTHROPLASTY UNICOMPARTMENTAL Right 6/24/2016    Procedure: RT KNEE ARTHROPLASTY UNICOMPARTMENTAL;  Surgeon: Gamal Correa MD;  Location: [facility] OR;  Service:    • KNEE SURGERY Left     arthroscopic   • SPINE SURGERY      chavez monae fusion         Social History     Occupational History   • Not on file.     Social History Main Topics   • Smoking status: Never Smoker   • Smokeless tobacco: Never Used   • Alcohol use No   • Drug use: No   • Sexual activity: Defer      Social History     Social History Narrative      History reviewed. No pertinent family history.      Review of Systems:   A 14 point review of systems  "was performed, pertinent positives discussed above, all other systems are negative    Physical Exam: 56 y.o. female  Vital Signs :   Vitals:    08/17/17 1357   BP: 140/80   BP Location: Left arm   Patient Position: Sitting   Cuff Size: Adult   Temp: 98.4 °F (36.9 °C)   TempSrc: Temporal Artery    Weight: 145 lb (65.8 kg)   Height: 61\" (154.9 cm)     General: Alert and Oriented x 3, No acute distress.  Psych: mood and affect appropriate; recent and remote memory intact  Eyes: conjunctiva clear; pupils equally round and reactive, sclera nonicteric  CV: no peripheral edema  Resp: normal respiratory effort  Skin: no rashes or wounds; normal turgor    Xrays:  -3 views (AP, lateral, and sunrise) were reviewed demonstrating end-stage isolated medial compartment OA with medial bone on bone articulation. The lateral and patellofemoral compartments are well preserved.  -A full length AP xray was ordered today for purposes of operative alignment demonstrating end stage medial compartment arthritic findings. There are no previous full length films for review    Assessment:  End-stage Left knee medial compartment osteoarthritis. Conservative measures have failed.      Plan:  The plan is to proceed with Left Unicompartmental Knee Replacement possible Total Knee Replacement. The patient voiced understanding of the risks, benefits, and alternative forms of treatment that were discussed with Dr Correa at the time of scheduling. Patient is planning on going home with home health same day.  In addition she apparently had oral surgery little over 2 weeks ago in which she had a tooth pulled and was placed on antibiotics, but no infection that she is aware of.  She will contact the oral surgeon as soon as she leaves here to have them clear her for partial knee replacement.    Roshni Alfaro, APRN  8/17/2017  "

## 2017-08-25 NOTE — OP NOTE
Name: Yeny Anthony  YOB: 1960    DATE OF SURGERY: 8/25/2017    PREOPERATIVE DIAGNOSIS: Left knee end-stage medial compartment osteoarthritis    POSTOPERATIVE DIAGNOSIS: Left knee end-stage compartment osteoarthritis    PROCEDURE PERFORMED: Left unicompartmental knee replacement    SURGEON: Gamal Correa M.D.    ASSISTANT: ARLEY HOWELL    IMPLANTS:     Implant Name Type Inv. Item Serial No.  Lot No. LRB No. Used   CMT BONE PALACOS R PLS/G W GENT 1X40 - [id number] Implant CMT BONE PALACOS R PLS/G W GENT 1X40  [facility] [id number] Left 1   COMP FEM UNI HIFLX PCOAT LT/M RT/L SZB - [id number] Implant COMP FEM UNI HIFLX PCOAT LT/M RT/L SZB  [facility] [id number] Left 1   COMP TIB UNI HIFLX PCOAT LM/RL SZ2 - [id number] Implant COMP TIB UNI HIFLX PCOAT LM/RL SZ2  [facility] [id number] Left 1   ART/SRF KN UNICOMPT HXPE SZ2 8MM - [id number] Implant ART/SRF KN UNICOMPT HXPE SZ2 8MM   [facility] [id number] Left 1       Estimated Blood Loss: 100cc  Specimens : none  Complications: none    DESCRIPTION OF PROCEDURE: The patient was taken to the operating room and placed in the supine position. A sequential compression device was carefully placed on the non-operative leg. Preoperative antibiotics were administered. Surgical time out was performed. After adequate induction of anesthesia, the leg was prepped and draped in the usual sterile fashion, exsanguinated with an Esmarch bandage and the tourniquet inflated to 250 mmHg. A slightly medial to midline incision was performed followed by a mini-midvastus arthrotomy. The patella was partially subluxed laterally.  I then carefully examined all 3 compartments of the knee.  There was end-stage medial compartment osteoarthritis with bone-on-bone articulation.  The ACL was normal.  The patellofemoral joint was normal.  The lateral joint was normal.  I then used the tibial extramedullary cutting guide.  It was set with the 4 mm stylus to  remove 4 mm of medial bone at the thickest point.  The cutting guide was pinned in place and the tibial cut was performed.  The tibial cut was then removed.  We then used the flexion extension blocks to carefully examine the balance between flexion and extension.  At this point the block was placed with the leg in full extension and the distal femoral cutting block was placed.  The distal femoral cut was performed.  We then flexed the knee to roughly 90° and again checked the balance in flexion and extension.  The knee was then balanced.  We then sized the femoral component at 90° of flexion the appropriate cutting guide was pinned in place.  All femoral cuts were then performed.    The medial meniscus and remnants of remnants of excessive posterior capsule were excised.  We then again flexed the knee and sized the tibial cut surface.  The tibial trial was then placed and the fixation lugs were drilled.  The trial components were then placed.  The trial spacer was then placed.  The knee was taken through a full range of motion.  The knee was nicely balanced and the 2 mm qiana easily fit in both flexion and extension with equal balance.  The trial components were then removed the knee was copiously irrigated with pulsed lavage and then injected with anesthetic cocktail.  The cut surfaces were then dried with clean laparotomy sponges.  The components were then cemented tibia followed by femur.  The excess cement was removed and the knee was held in full extension with a 2 mm qiana in place.  After the cement had completely hardened we again copiously irrigated the knee and chose the final polyethylene insert which was equal to the trial which gave nice balance and good soft tissue tension without being overly tight.  The knee was then closed in multiple layers in standard fashion and the patient was extubated and transferred to the PACU for recovery from anesthesia.  At the end of the case, the sponge and needle counts  were reported as being correct. There were no known complications. The patient was then transported to the recovery room.      Gamal Correa MD  8/25/2017

## 2017-08-28 ENCOUNTER — TELEPHONE (OUTPATIENT)
Dept: ORTHOPEDIC SURGERY | Facility: CLINIC | Age: 57
End: 2017-08-28

## 2017-08-28 NOTE — TELEPHONE ENCOUNTER
Have spoken with the home health nurse.  Patient has an island dressing although covering her knee incision.  Have advised the home health nurse that the dressing does need to be changed daily with a sterile dry dressing.  Have left it open for them to call if they've any other questions or concerns

## 2017-08-31 RX ORDER — HYDROCODONE BITARTRATE AND ACETAMINOPHEN 7.5; 325 MG/1; MG/1
TABLET ORAL
Qty: 75 TABLET | Refills: 0 | Status: SHIPPED | OUTPATIENT
Start: 2017-08-31 | End: 2017-09-12 | Stop reason: SDUPTHER

## 2017-09-05 ENCOUNTER — TELEPHONE (OUTPATIENT)
Dept: ORTHOPEDIC SURGERY | Facility: CLINIC | Age: 57
End: 2017-09-05

## 2017-09-05 NOTE — TELEPHONE ENCOUNTER
----- Message from Yeny Anthony sent at 9/4/2017  9:45 PM EDT -----  Regarding: Non-Urgent Medical Question  Contact: [phone]  Hello  I am a week post unicompartmental knee replacement surgery with Dr Correa. Should I call to schedule out patient pt yet? Last time it took me weeks to get into pt.  Still having home health pt coming out this week.   I may need orders from Dr Correa.   Thank you

## 2017-09-05 NOTE — TELEPHONE ENCOUNTER
Would recommend that you go ahead and set up physical therapy starting 2 weeks after surgery, and we can give you an order at her 2 week follow-up appointment.  Order is not needed to schedule appointment

## 2017-09-08 ENCOUNTER — OFFICE VISIT (OUTPATIENT)
Dept: ORTHOPEDIC SURGERY | Facility: CLINIC | Age: 57
End: 2017-09-08

## 2017-09-08 VITALS — WEIGHT: 146 LBS | TEMPERATURE: 98.7 F | HEIGHT: 61 IN | BODY MASS INDEX: 27.56 KG/M2

## 2017-09-08 DIAGNOSIS — Z98.890 S/P KNEE SURGERY: Primary | ICD-10-CM

## 2017-09-08 PROCEDURE — 99024 POSTOP FOLLOW-UP VISIT: CPT | Performed by: NURSE PRACTITIONER

## 2017-09-08 NOTE — PROGRESS NOTES
Yeny Anthony : 1960 MRN: [id number] DATE: 2017    DIAGNOSIS: 2 week follow up left total knee      SUBJECTIVE:Patient returns today for 2 week follow up of left total knee replacement. Patient reports doing well with no unusual complaints. Appears to be progressing appropriately.    OBJECTIVE:   Exam:. The incision is healing appropriately. No sign of infection. Range of motion is progressing as expected. The calf is soft and nontender with a negative Homans sign.    ASSESSMENT: 2 week status post left knee replacement.    PLAN: 1) Staples removed and steri strips applied   2) Order given for PT   3) Discontinue KENYATTA hose   4) Continue ice PRN   5) aspirin 325 mg orally every day for 1 month   6) Follow up in 6 weeks with repeat Xrays of left knee (3views)    Roshni Alfaro, APRN  2017

## 2017-09-12 ENCOUNTER — TELEPHONE (OUTPATIENT)
Dept: ORTHOPEDIC SURGERY | Facility: CLINIC | Age: 57
End: 2017-09-12

## 2017-09-12 RX ORDER — HYDROCODONE BITARTRATE AND ACETAMINOPHEN 7.5; 325 MG/1; MG/1
1-2 TABLET ORAL EVERY 6 HOURS PRN
Qty: 60 TABLET | Refills: 0 | Status: SHIPPED | OUTPATIENT
Start: 2017-09-12 | End: 2017-10-20

## 2017-09-13 ENCOUNTER — TELEPHONE (OUTPATIENT)
Dept: ORTHOPEDIC SURGERY | Facility: CLINIC | Age: 57
End: 2017-09-13

## 2017-09-13 NOTE — TELEPHONE ENCOUNTER
----- Message from Yeny Anthony sent at 9/12/2017 11:18 PM EDT -----  Regarding: Non-Urgent Medical Question  Contact: [phone]    MY CHART MESSAGE:    Hello   I had unicompartmental knee replacement on the 25th of August. I have noticed over the past week being excessively tired/exhausted. I am wondering if this is something I should be concerned about? Want everything to go well with my new knee!!  Thank you  Tanna

## 2017-09-14 ENCOUNTER — TELEPHONE (OUTPATIENT)
Dept: ORTHOPEDIC SURGERY | Facility: CLINIC | Age: 57
End: 2017-09-14

## 2017-09-14 NOTE — TELEPHONE ENCOUNTER
----- Message from Yeny Anthony sent at 9/13/2017  7:57 PM EDT -----  Regarding: Non-Urgent Medical Question  Contact: [phone]    MY CHART MESSAGE:    Delmar Ruiz  I messaged you last night saying I was feeling more exhausted than I thought I shoiuld  I think I know why now  I seem to have a full blown sinus infection as of today   Is this something I need to be concerned with with the new knee implant ?     Thanks  Tanna Anthony

## 2017-09-21 RX ORDER — MELOXICAM 15 MG/1
TABLET ORAL
Qty: 30 TABLET | Refills: 0 | Status: SHIPPED | OUTPATIENT
Start: 2017-09-21 | End: 2017-10-20

## 2017-10-20 ENCOUNTER — OFFICE VISIT (OUTPATIENT)
Dept: ORTHOPEDIC SURGERY | Facility: CLINIC | Age: 57
End: 2017-10-20

## 2017-10-20 VITALS — WEIGHT: 145 LBS | TEMPERATURE: 98.2 F | BODY MASS INDEX: 27.38 KG/M2 | HEIGHT: 61 IN

## 2017-10-20 DIAGNOSIS — Z96.652 S/P LEFT UNICOMPARTMENTAL KNEE REPLACEMENT: Primary | ICD-10-CM

## 2017-10-20 DIAGNOSIS — Z96.652 HISTORY OF TOTAL LEFT KNEE REPLACEMENT: ICD-10-CM

## 2017-10-20 PROCEDURE — 99024 POSTOP FOLLOW-UP VISIT: CPT | Performed by: ORTHOPAEDIC SURGERY

## 2017-10-20 PROCEDURE — 73562 X-RAY EXAM OF KNEE 3: CPT | Performed by: ORTHOPAEDIC SURGERY

## 2017-10-20 NOTE — PROGRESS NOTES
Yeny Anthony : 1960 MRN: [id number] DATE: 10/20/2017    DIAGNOSIS: 8 week follow up left total knee      SUBJECTIVE:Patient returns today for 8 week follow up of left total knee replacement. Patient reports doing well with no unusual complaints. Appears to be progressing appropriately.    OBJECTIVE:   Exam:. The incision is well healed. No sign of infection. Range of motion is measured at 0 to 125. The calf is soft and nontender with a negative Homans sign. Strength is progressing and the patient is ambulating appropriately.    DIAGNOSTIC STUDIES  Xrays: 3 views of the left knee (AP, lateral, and sunrise) were ordered and reviewed for evaluation of recent knee replacement. They demonstrate a well positioned, well aligned knee replacement without complicating factors noted. In comparison with previous films there has been no change.    ASSESSMENT: 8 week status post left knee replacement.    PLAN: 1) Continue with PT exercises as prescribed   2) Follow up in 10 months - xray both knees    Gamal Correa MD  10/20/2017

## 2017-10-24 RX ORDER — MELOXICAM 15 MG/1
TABLET ORAL
Qty: 30 TABLET | Refills: 0 | Status: SHIPPED | OUTPATIENT
Start: 2017-10-24 | End: 2017-11-17 | Stop reason: SDUPTHER

## 2017-10-25 ENCOUNTER — TELEPHONE (OUTPATIENT)
Dept: ORTHOPEDIC SURGERY | Facility: CLINIC | Age: 57
End: 2017-10-25

## 2017-10-25 NOTE — TELEPHONE ENCOUNTER
----- Message from Yeny Anthony sent at 10/24/2017  6:33 PM EDT -----  Regarding: Non-Urgent Medical Question  Contact: [phone]    MY CHART MESSAGE:    Hi  I was released pot operatively from unicompartmental knee replacement last Friday  On Saturday I began having sharp pain in left side of that knee. I am trying to rest it as possible in hopes that I have just irritated it. Do you think I need to be seen?

## 2017-10-26 NOTE — TELEPHONE ENCOUNTER
Would recommend ice, elevation, scheduled anti-inflammatories and let us know if pain does not improve within the next few days.

## 2017-11-17 RX ORDER — MELOXICAM 15 MG/1
TABLET ORAL
Qty: 30 TABLET | Refills: 0 | Status: SHIPPED | OUTPATIENT
Start: 2017-11-17 | End: 2018-01-03

## 2017-11-24 ENCOUNTER — PATIENT MESSAGE (OUTPATIENT)
Dept: ORTHOPEDIC SURGERY | Facility: CLINIC | Age: 57
End: 2017-11-24

## 2017-11-27 ENCOUNTER — TELEPHONE (OUTPATIENT)
Dept: ORTHOPEDIC SURGERY | Facility: CLINIC | Age: 57
End: 2017-11-27

## 2017-11-27 NOTE — TELEPHONE ENCOUNTER
----- Message from Yeny Anthony sent at 11/24/2017  3:11 PM EST -----  Regarding: Non-Urgent Medical Question  Contact: [phone]  MY CHART MESSAGE:    hello  i am having dental work done next week  could i get keflex called into my pharmacy since I have new knee replacement

## 2017-11-28 RX ORDER — CEPHALEXIN 500 MG/1
2000 CAPSULE ORAL ONCE
Qty: 4 CAPSULE | Refills: 5 | Status: SHIPPED | OUTPATIENT
Start: 2017-11-28 | End: 2017-11-28

## 2018-01-03 ENCOUNTER — OFFICE VISIT (OUTPATIENT)
Dept: ORTHOPEDIC SURGERY | Facility: CLINIC | Age: 58
End: 2018-01-03

## 2018-01-03 VITALS — TEMPERATURE: 97.4 F | BODY MASS INDEX: 27.38 KG/M2 | WEIGHT: 145 LBS | HEIGHT: 61 IN

## 2018-01-03 DIAGNOSIS — M77.41 METATARSALGIA OF BOTH FEET: ICD-10-CM

## 2018-01-03 DIAGNOSIS — M77.42 METATARSALGIA OF BOTH FEET: ICD-10-CM

## 2018-01-03 DIAGNOSIS — M20.10 VALGUS DEFORMITY OF GREAT TOE, UNSPECIFIED LATERALITY: ICD-10-CM

## 2018-01-03 DIAGNOSIS — M19.079 ARTHRITIS OF FOOT: ICD-10-CM

## 2018-01-03 DIAGNOSIS — M79.671 BILATERAL FOOT PAIN: Primary | ICD-10-CM

## 2018-01-03 DIAGNOSIS — M79.672 BILATERAL FOOT PAIN: Primary | ICD-10-CM

## 2018-01-03 PROCEDURE — 73630 X-RAY EXAM OF FOOT: CPT | Performed by: ORTHOPAEDIC SURGERY

## 2018-01-03 PROCEDURE — 99244 OFF/OP CNSLTJ NEW/EST MOD 40: CPT | Performed by: ORTHOPAEDIC SURGERY

## 2018-01-03 NOTE — PROGRESS NOTES
New Patient Complaint      Patient: Yeny Anthony  YOB: 1960 57 y.o. female  Medical Record Number: [id number]    Chief Complaints:  Feet hurt    History of Present Illness:  Patient describes a two-month history of worsening severe on the right and mild-to-moderate on the left intermittent crushing pain in the dorsum of both feet right greater than left.  It's worse with prolonged walking and standing and improved a little bit with use of anti-inflammatories.  She uses ibuprofen 400 mg 2-3 times a day without GI upset.    She has a history of chronic deformity to her toes and says that she did a lot of ballet when she was younger and used have quite a bit of discomfort in the toes but no longer has this.  She recently had left knee replacement in August 2016 and wonders if that may change her gait some.  She had partial right knee replacement in June 2016.    She is seen today at the request of  was requested my opinion regarding etiology and treatment of this condition        HPI    Allergies:   Allergies   Allergen Reactions   • Adhesive Tape Itching   • Codeine Itching       Medications:   Current Outpatient Prescriptions on File Prior to Visit   Medication Sig   • buPROPion SR (WELLBUTRIN SR) 150 MG 12 hr tablet Take 150 mg by mouth Daily.   • CALCIUM CARBONATE PO Take 1,000 mg by mouth Daily.   • pantoprazole (PROTONIX) 40 MG EC tablet Take 40 mg by mouth every morning.   • pravastatin (PRAVACHOL) 10 MG tablet Take 10 mg by mouth Daily.   • [DISCONTINUED] fexofenadine (ALLEGRA) 180 MG tablet Take 180 mg by mouth Daily.   • [DISCONTINUED] meloxicam (MOBIC) 15 MG tablet TAKE 1 TABLET BY MOUTH DAILY     No current facility-administered medications on file prior to visit.        Past Medical History:   Diagnosis Date   • Acid reflux    • Bundle branch block    • Depression    • High cholesterol    • History of migraine    • RHA (rheumatoid arthritis)    • Seasonal affective  "disorder    • Sleep apnea     mild   • Spinal headache     chavez monae fusion lumbar are  for delivery of child anesthesia attempted epidural above the fusion and resulted  in spinal headache   • Toxic condition due to an overly active thyroid gland      Past Surgical History:   Procedure Laterality Date   • BREAST ABSCESS INCISION AND DRAINAGE     • KNEE ARTHROPLASTY UNICOMPARTMENTAL Right 6/24/2016    Procedure: RT KNEE ARTHROPLASTY UNICOMPARTMENTAL;  Surgeon: Gamal Correa MD;  Location: [facility];  Service:    • KNEE ARTHROPLASTY UNICOMPARTMENTAL Left 8/25/2017    Procedure: KNEE ARTHROPLASTY UNICOMPARTMENTAL;  Surgeon: Gamal Correa MD;  Location: [facility];  Service:    • KNEE SURGERY Left     arthroscopic   • SPINE SURGERY      chavez monae fusion      Social History     Occupational History   • Not on file.     Social History Main Topics   • Smoking status: Never Smoker   • Smokeless tobacco: Never Used   • Alcohol use No   • Drug use: No   • Sexual activity: Defer      Social History     Social History Narrative     Family History   Problem Relation Age of Onset   • Malig Hyperthermia Neg Hx        Review of Systems: 14 point review of systems performed, positive pertinent findings identified in HPI. All remaining systems negative Unexpected weight gain    Review of Systems      Physical Exam:   Vitals:    01/03/18 1113   Temp: 97.4 °F (36.3 °C)   TempSrc: Temporal Artery    Weight: 65.8 kg (145 lb)   Height: 154.9 cm (61\")     Physical Exam   Constitutional: pleasant, well developed   Eyes: sclera non icteric  Hearing : adequate for exam  Cardiovascular: palpable pulses in bilaeral feet, bilateral calves/ thighs NT without sign of DVT  Respiratoy: breathing unlabored   Neurological: grossly sensate to LT throughout bilateral LEs  Psychiatric: oriented with normal mood and affect.   Lymphatic: No palpable popliteal lymphadenopathy bilateral LEs  Skin: intact throughout bilateral " legs/feet  Musculoskeletal:Examination of both feet shows somewhat of a windswept appearance to the toes that is not quite passively correctable to neutral but no focal pain at any of the MTP joints.  There is mild to moderate discomfort over the dorsal midfoot right more so than left with some mild fullness on the right more so than left.  There is some discomfort with second and third TMT and inflation on the right none on the left.  Physical Exam  Ortho Exam    Radiology: 3 views of both feet were ordered to evaluate pain reviewed and no prior x-rays available for comparison.  There is valgus alignment of her first second and third toes with some degenerative change at the first and second TMT joints right greater than left.  There is also moderate midfoot degenerative change right greater than left with a questionable area over the base the second metatarsal may be a stress fracture.    Assessment/Plan: Bilateral midfoot pain with arthritic change right greater than left.    Bilateral multiple toe valgus deformities    Her toes were not bothering her.  Right foot is bothering her more so than the left.  Certainly concern is for stress fracture or exacerbation of arthritic change.    She will get some power step orthotics to help unload the midfoot.  She will use of Voltaren gel that she already has apply this to the dorsum of her foot twice daily per package instructions.    We'll get an MRI of her right foot for further evaluation to make sure there is no stress fracture.    She understands to call to schedule follow-up appointment once MRI has been scheduled.    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

## 2018-01-16 ENCOUNTER — HOSPITAL ENCOUNTER (OUTPATIENT)
Dept: MRI IMAGING | Facility: HOSPITAL | Age: 58
Discharge: HOME | End: 2018-01-16
Attending: ORTHOPAEDIC SURGERY

## 2018-01-19 ENCOUNTER — HOSPITAL ENCOUNTER (OUTPATIENT)
Dept: MRI IMAGING | Facility: HOSPITAL | Age: 58
End: 2018-01-19
Attending: ORTHOPAEDIC SURGERY

## 2018-01-26 ENCOUNTER — HOSPITAL ENCOUNTER (OUTPATIENT)
Dept: MRI IMAGING | Facility: HOSPITAL | Age: 58
Discharge: HOME | End: 2018-01-26
Attending: ORTHOPAEDIC SURGERY | Admitting: ORTHOPAEDIC SURGERY

## 2018-01-26 DIAGNOSIS — M77.42 METATARSALGIA OF BOTH FEET: ICD-10-CM

## 2018-01-26 DIAGNOSIS — M77.41 METATARSALGIA OF BOTH FEET: ICD-10-CM

## 2018-01-26 PROCEDURE — 73718 MRI LOWER EXTREMITY W/O DYE: CPT

## 2018-02-02 ENCOUNTER — TELEPHONE (OUTPATIENT)
Dept: ORTHOPEDIC SURGERY | Facility: CLINIC | Age: 58
End: 2018-02-02

## 2018-02-02 ENCOUNTER — OFFICE VISIT (OUTPATIENT)
Dept: ORTHOPEDIC SURGERY | Facility: CLINIC | Age: 58
End: 2018-02-02

## 2018-02-02 VITALS — HEIGHT: 61 IN | WEIGHT: 145 LBS | TEMPERATURE: 97.6 F | BODY MASS INDEX: 27.38 KG/M2

## 2018-02-02 DIAGNOSIS — M20.10 VALGUS DEFORMITY OF GREAT TOE, UNSPECIFIED LATERALITY: ICD-10-CM

## 2018-02-02 DIAGNOSIS — M19.079 ARTHRITIS OF FOOT: Primary | ICD-10-CM

## 2018-02-02 PROCEDURE — 99214 OFFICE O/P EST MOD 30 MIN: CPT | Performed by: ORTHOPAEDIC SURGERY

## 2018-02-02 NOTE — PATIENT INSTRUCTIONS
When you know when injection is scheduled, call office immediately to make appt for at least 3 weeks after test is complete

## 2018-02-02 NOTE — TELEPHONE ENCOUNTER
----- Message from Yeny Anthony sent at 2/2/2018 11:08 AM EST -----  Regarding: Referral Request  Contact: [phone]  Hello  I was called yesterday because my insurance did not get a referral from my GP Drs. I called Dr Anand office and they are taking care of that .  Just wanted to let someone know.  Thank you  Tanna Anthony      FYJAUN-DAVE

## 2018-02-05 ENCOUNTER — TELEPHONE (OUTPATIENT)
Dept: ORTHOPEDIC SURGERY | Facility: CLINIC | Age: 58
End: 2018-02-05

## 2018-02-05 NOTE — TELEPHONE ENCOUNTER
----- Message from Yeny Anthony sent at 2/2/2018  8:02 PM EST -----  Regarding: Visit Follow-Up Question  Contact: [phone]  Hello  I was wondering if the misalignment of my toes are part of the reason for the pain in my foot. If it is rather than struggle with pain solutions to the foot would it be better to have the toes surgically realigned.  I would be ok with that too.  Thank you

## 2018-02-07 NOTE — TELEPHONE ENCOUNTER
I have left 4-5 messages for patient over the last several days regarding her question and asked that she call me back and given her the times a week in the office.  If not I will discuss this with her further at her follow-up appointment later this month or we'll try to call again when he get a chance.

## 2018-03-09 ENCOUNTER — HOSPITAL ENCOUNTER (OUTPATIENT)
Dept: GENERAL RADIOLOGY | Facility: HOSPITAL | Age: 58
Discharge: HOME | End: 2018-03-09
Attending: ORTHOPAEDIC SURGERY | Admitting: ORTHOPAEDIC SURGERY

## 2018-03-09 DIAGNOSIS — M19.079 ARTHRITIS OF FOOT: ICD-10-CM

## 2018-03-09 PROCEDURE — 25010000002 METHYLPREDNISOLONE PER 40 MG: Performed by: ORTHOPAEDIC SURGERY

## 2018-03-09 PROCEDURE — 77002 NEEDLE LOCALIZATION BY XRAY: CPT

## 2018-03-09 PROCEDURE — 0 IOPAMIDOL 61 % SOLUTION: Performed by: ORTHOPAEDIC SURGERY

## 2018-03-09 RX ORDER — LIDOCAINE HYDROCHLORIDE 10 MG/ML
10 INJECTION, SOLUTION INFILTRATION; PERINEURAL ONCE
Status: COMPLETED | OUTPATIENT
Start: 2018-03-09 | End: 2018-03-09

## 2018-03-09 RX ORDER — METHYLPREDNISOLONE ACETATE 40 MG/ML
40 INJECTION, SUSPENSION INTRA-ARTICULAR; INTRALESIONAL; INTRAMUSCULAR; SOFT TISSUE ONCE
Status: COMPLETED | OUTPATIENT
Start: 2018-03-09 | End: 2018-03-09

## 2018-03-09 RX ORDER — BUPIVACAINE HYDROCHLORIDE 2.5 MG/ML
10 INJECTION, SOLUTION EPIDURAL; INFILTRATION; INTRACAUDAL ONCE
Status: COMPLETED | OUTPATIENT
Start: 2018-03-09 | End: 2018-03-09

## 2018-03-09 RX ADMIN — IOPAMIDOL 1 ML: 612 INJECTION, SOLUTION INTRAVENOUS at 12:33

## 2018-03-09 RX ADMIN — LIDOCAINE HYDROCHLORIDE 10 ML: 10 INJECTION, SOLUTION INFILTRATION; PERINEURAL at 12:33

## 2018-03-09 RX ADMIN — METHYLPREDNISOLONE ACETATE 40 MG: 40 INJECTION, SUSPENSION INTRA-ARTICULAR; INTRALESIONAL; INTRAMUSCULAR; SOFT TISSUE at 12:33

## 2018-03-09 RX ADMIN — BUPIVACAINE HYDROCHLORIDE 3 ML: 2.5 INJECTION, SOLUTION EPIDURAL; INFILTRATION; INTRACAUDAL; PERINEURAL at 12:32

## 2018-03-12 ENCOUNTER — TELEPHONE (OUTPATIENT)
Dept: ORTHOPEDIC SURGERY | Facility: CLINIC | Age: 58
End: 2018-03-12

## 2018-03-12 NOTE — TELEPHONE ENCOUNTER
----- Message from Yeny Anthony sent at 3/11/2018  1:06 AM EST -----  Regarding: Visit Follow-Up Question  Contact: [phone]  I had the injections in my right foot last week in radiology  I am supposed to schedule a follow up visit .

## 2018-03-12 NOTE — TELEPHONE ENCOUNTER
Can you please call pt to schedule f/u appt in about 2 weeks and let me know when it is scheduled, thanks

## 2018-03-19 ENCOUNTER — TELEPHONE (OUTPATIENT)
Dept: ORTHOPEDIC SURGERY | Facility: CLINIC | Age: 58
End: 2018-03-19

## 2018-03-19 NOTE — TELEPHONE ENCOUNTER
----- Message from Yeny Anthony sent at 3/19/2018  3:25 AM EDT -----  Regarding: Non-Urgent Medical Question  Contact: [phone]    MY CHART MESSAGE:      Hi Sara   Now that my knees aren't hurting anymore, it seems both my hips are hurting a great deal!  I assume Dr Correa is the correct person for me to see for hips?   Let me know a time I could come in and ill get a referel sent from gp Yoel Anthony      availability  Monday Thursday Friday after noon

## 2018-03-19 NOTE — TELEPHONE ENCOUNTER
----- Message from Yeny Anthony sent at 3/19/2018  3:25 AM EDT -----  Regarding: Non-Urgent Medical Question  Contact: [phone]  Delmar Ruiz   Now that my knees aren't hurting anymore, it seems both my hips are hurting a great deal!  I assume Dr Correa is the correct person for me to see for hips?   Let me know a time I could come in and ill get a referel sent from gp Yoel Anthony      availability  Monday Thursday Friday after noon

## 2018-03-20 NOTE — TELEPHONE ENCOUNTER
Made several attempts to contact patient, LMOM for patient to call and schedule an OPNC with RBB per [email or web address]

## 2018-03-26 ENCOUNTER — OFFICE VISIT (OUTPATIENT)
Dept: ORTHOPEDIC SURGERY | Facility: CLINIC | Age: 58
End: 2018-03-26

## 2018-03-26 VITALS — TEMPERATURE: 97.6 F | HEIGHT: 61 IN | WEIGHT: 150 LBS | BODY MASS INDEX: 28.32 KG/M2

## 2018-03-26 DIAGNOSIS — M75.101 TEAR OF RIGHT ROTATOR CUFF, UNSPECIFIED TEAR EXTENT: ICD-10-CM

## 2018-03-26 DIAGNOSIS — M25.511 RIGHT SHOULDER PAIN, UNSPECIFIED CHRONICITY: Primary | ICD-10-CM

## 2018-03-26 PROCEDURE — 99214 OFFICE O/P EST MOD 30 MIN: CPT | Performed by: ORTHOPAEDIC SURGERY

## 2018-03-26 PROCEDURE — 20610 DRAIN/INJ JOINT/BURSA W/O US: CPT | Performed by: ORTHOPAEDIC SURGERY

## 2018-03-26 PROCEDURE — 73030 X-RAY EXAM OF SHOULDER: CPT | Performed by: ORTHOPAEDIC SURGERY

## 2018-03-26 RX ORDER — MELOXICAM 15 MG/1
TABLET ORAL
Qty: 30 TABLET | Refills: 3 | Status: SHIPPED | OUTPATIENT
Start: 2018-03-26 | End: 2018-08-27 | Stop reason: SDUPTHER

## 2018-03-26 RX ADMIN — METHYLPREDNISOLONE ACETATE 80 MG: 80 INJECTION, SUSPENSION INTRA-ARTICULAR; INTRALESIONAL; INTRAMUSCULAR; SOFT TISSUE at 15:48

## 2018-03-26 RX ADMIN — BUPIVACAINE HYDROCHLORIDE 4 ML: 5 INJECTION, SOLUTION EPIDURAL; INTRACAUDAL at 15:48

## 2018-03-26 NOTE — PROGRESS NOTES
New Right Shoulder      Patient: Yeny Anthony        YOB: 1960    Medical Record Number: [id number]        Chief Complaints:   Chief Complaint   Patient presents with   • Right Shoulder - Establish Care, Pain     Right shoulder pain      History of Present Illness: This is a  57 y.o. female who presents with plates of right shoulder pain is been ongoing for months no history injury change in activity is worsened over last week or 2.  She does have night pain she cannot get her bra on.  Her symptoms are described as severe intermittent clicking popping snapping is intact past medical history marked for osteoporosis rheumatoid arthritis          Allergies:   Allergies   Allergen Reactions   • Adhesive Tape Itching   • Codeine Itching       Medications:   Home Medications:  Current Outpatient Prescriptions on File Prior to Visit   Medication Sig   • buPROPion SR (WELLBUTRIN SR) 150 MG 12 hr tablet Take 150 mg by mouth Daily.   • CALCIUM CARBONATE PO Take 1,000 mg by mouth Daily.   • pantoprazole (PROTONIX) 40 MG EC tablet Take 40 mg by mouth every morning.   • pravastatin (PRAVACHOL) 10 MG tablet Take 10 mg by mouth Daily.     No current facility-administered medications on file prior to visit.      Current Medications:  Scheduled Meds:  Continuous Infusions:  No current facility-administered medications for this visit.   PRN Meds:.    Past Medical History:   Diagnosis Date   • Acid reflux    • Bundle branch block    • Depression    • High cholesterol    • History of migraine    • RHA (rheumatoid arthritis)    • Seasonal affective disorder    • Sleep apnea     mild   • Spinal headache     chavez monae fusion lumbar are  for delivery of child anesthesia attempted epidural above the fusion and resulted  in spinal headache   • Toxic condition due to an overly active thyroid gland         Past Surgical History:   Procedure Laterality Date   • BREAST ABSCESS INCISION AND DRAINAGE     • KNEE  "ARTHROPLASTY UNICOMPARTMENTAL Right 6/24/2016    Procedure: RT KNEE ARTHROPLASTY UNICOMPARTMENTAL;  Surgeon: Gamal Correa MD;  Location: [facility];  Service:    • KNEE ARTHROPLASTY UNICOMPARTMENTAL Left 8/25/2017    Procedure: KNEE ARTHROPLASTY UNICOMPARTMENTAL;  Surgeon: Gamal Correa MD;  Location: [facility];  Service:    • KNEE SURGERY Left     arthroscopic   • SPINE SURGERY      chavez monae fusion         Social History     Occupational History   • Not on file.     Social History Main Topics   • Smoking status: Never Smoker   • Smokeless tobacco: Never Used   • Alcohol use No   • Drug use: No   • Sexual activity: Defer    Social History     Social History Narrative   • No narrative on file        Family History   Problem Relation Age of Onset   • Malig Hyperthermia Neg Hx              Review of Systems: Review of systems are remarkable in the chart by the patient the remainder are negative    Review of Systems      Physical Exam: 57 y.o. female  General Appearance:    Alert, cooperative, in no acute distress                 Vitals:    03/26/18 1508   Temp: 97.6 °F (36.4 °C)   Weight: 68 kg (150 lb)   Height: 154.9 cm (61\")      Patient is alert and read ×3 no acute distress appears her above-listed at height weight and age.  Affect is normal respiratory rate is normal unlabored. Heart rate regular rate rhythm, sclera, dentition and hearing are normal for the purpose of this exam.    Ortho Exam  Physical exam of the right shoulder reveals no overlying skin changes no lymphedema no lymphadenopathy.  Patient has active flexion 180 with mild symptoms abduction is similar external rotation is to 50 and internal rotation to the upper lumbar spine with mild symptoms.  Patient has good rotator cuff strength 4+ over 5 with isometric strength testing with pain.  Patient has a positive impingement and a positive Simmons sign.  Patient has good cervical range of motion which is full and asymptomatic no " radicular symptoms.  Patient has a normal elbow exam.  Good distal pulses are present  Patient has pain with overhead activity and a positive Neer sign and a positive empty can sign , a positive drop arm and a definitive painful arc  Large Joint Arthrocentesis  Date/Time: 3/26/2018 3:48 PM  Consent given by: patient  Site marked: site marked  Timeout: Immediately prior to procedure a time out was called to verify the correct patient, procedure, equipment, support staff and site/side marked as required   Supporting Documentation  Indications: pain   Procedure Details  Location: shoulder - R subacromial bursa  Needle size: 22 G  Approach: lateral  Medications administered: 80 mg methylPREDNISolone acetate 80 MG/ML; 4 mL bupivacaine (PF) 0.5 %                  Radiology:   AP, Scapular Y and Axillary Lateral of the right shoulder were ordered/reviewed to evauate shoulder pain.  I've no comparative films she has some mild acromioclavicular arthritis otherwise heads well-seated within the glenoid she does have some apparent osteopenia  Xr Shoulder 2+ View Right    Result Date: 3/26/2018  Ordering physician's impression is located in the Encounter Note dated 03/26/18. X-ray performed in the CR room.       Assessment/Plan: Right shoulder pain I think this is more impingement plan is to injectors a diagnostic and therapeutic tool.  I'll start her on Modic with strict cautions also have her see physical therapy to work on strengthening and I will see her back in 4 weeks if she fails to improve we will pursue other means of testing  Cortisone Injection. See procedure note.  Cortisone Injection for DIAGNOSTIC and THERAPUTIC purposes.  OTC analgesics as needed with dosage warning and instructions given.

## 2018-03-28 ENCOUNTER — TELEPHONE (OUTPATIENT)
Dept: ORTHOPEDIC SURGERY | Facility: CLINIC | Age: 58
End: 2018-03-28

## 2018-03-28 NOTE — TELEPHONE ENCOUNTER
----- Message from Yeny Anthony sent at 3/28/2018  2:50 AM EDT -----  Regarding: Non-Urgent Medical Question  Contact: [phone]  Delmar Ruiz.  I have a referral to Dr Correa because my hips are really hurting both sides. Can you set me up an appointment. Probably sny afternoon except Friday and Monday this next couple weeks     Thank you

## 2018-03-29 RX ORDER — BUPIVACAINE HYDROCHLORIDE 5 MG/ML
4 INJECTION, SOLUTION EPIDURAL; INTRACAUDAL
Status: COMPLETED | OUTPATIENT
Start: 2018-03-26 | End: 2018-03-26

## 2018-03-29 RX ORDER — METHYLPREDNISOLONE ACETATE 80 MG/ML
80 INJECTION, SUSPENSION INTRA-ARTICULAR; INTRALESIONAL; INTRAMUSCULAR; SOFT TISSUE
Status: COMPLETED | OUTPATIENT
Start: 2018-03-26 | End: 2018-03-26

## 2018-04-03 ENCOUNTER — APPOINTMENT (OUTPATIENT)
Dept: PHYSICAL THERAPY | Facility: HOSPITAL | Age: 58
End: 2018-04-03
Attending: ORTHOPAEDIC SURGERY

## 2018-05-22 ENCOUNTER — TELEPHONE (OUTPATIENT)
Dept: ORTHOPEDIC SURGERY | Facility: CLINIC | Age: 58
End: 2018-05-22

## 2018-06-04 ENCOUNTER — TELEPHONE (OUTPATIENT)
Dept: ORTHOPEDIC SURGERY | Facility: CLINIC | Age: 58
End: 2018-06-04

## 2018-06-04 NOTE — TELEPHONE ENCOUNTER
----- Message from Yeny Anthony sent at 6/4/2018  8:33 AM EDT -----  Regarding: Visit Follow-Up Question  Contact: [phone]  Hi there.  I would  like to schedule an appointment with you in the next few weeks. I am having a great deal of pain in both feet mid area.  I’m taking Romelia daily to function.    I should be able to ask off to come to you now   Terrykbob

## 2018-06-05 PROBLEM — M19.079 ARTHRITIS OF FOOT: Status: ACTIVE | Noted: 2018-06-05

## 2018-06-05 PROBLEM — M77.42 METATARSALGIA OF BOTH FEET: Status: ACTIVE | Noted: 2018-06-05

## 2018-06-05 PROBLEM — M79.672 BILATERAL FOOT PAIN: Status: ACTIVE | Noted: 2018-06-05

## 2018-06-05 PROBLEM — M77.41 METATARSALGIA OF BOTH FEET: Status: ACTIVE | Noted: 2018-06-05

## 2018-06-05 PROBLEM — M79.671 BILATERAL FOOT PAIN: Status: ACTIVE | Noted: 2018-06-05

## 2018-06-05 NOTE — PROGRESS NOTES
Foot Follow Up      Patient: Yeny Anthony    YOB: 1960 57 y.o. female    Chief Complaints: Foot pain    History of Present Illness: Patient was  last seen on 2/2/18 with a three-month history of bilateral foot pain and moderate on the right and mild on the left with intermittent crushing pain in the dorsum of both feet.  We reviewed MRI at that time which showed arthritis of the second and third TMT joints as well as arthritic change of the first second and third MTP joints with cystic change at the base of the second proximal phalanx.  She was sent for injections in the second and third TMT joints which were performed on 3/9/18.  Instructions were also given for heel cord stretching exercises and use of power step orthotics    She states she had about 50-60% relief for about 2 weeks after the injections but hasn't really been doing much in way of stretching.  She complains of moderate throbbing intermittent aching pain over the dorsum of the right midfoot more so than into the first second and third toes which demonstrate persistent valgus deformity.  She states that the Mobic does seem to help quite a bit but does not relieve all of her pain.    She did not want to be seen for left foot today  HPI    ROS: Foot pain  Past Medical History:   Diagnosis Date   • Acid reflux    • Bundle branch block    • Depression    • High cholesterol    • History of migraine    • RHA (rheumatoid arthritis)    • Seasonal affective disorder    • Sleep apnea     mild   • Spinal headache     chavez monae fusion lumbar are  for delivery of child anesthesia attempted epidural above the fusion and resulted  in spinal headache   • Toxic condition due to an overly active thyroid gland      Physical Exam:   There were no vitals filed for this visit.  Well developed with normal mood.Right foot shows moderate discomfort over the dorsum of the fifth Warmth or erythema only slight swelling.  There is chronic valgus  deformity with first second and third more so than fourth and fifth toes are not quite passively correctable to neutral.      Radiology: 3 views the right foot ordered to evaluate pain today reviewed and compared with previous x-rays.  I do not appreciate significant interval change.  There are significant arthritic change to the second and third more so than fourth TMT joint.  There is valgus deformity of the lesser toes most of the first second and third with degenerative changes most notably at the first and second.      Assessment/Plan: Bilateral midfoot arthritis with minimally symptomatic first MTP arthritis and hallux valgus deformity with arthritic change of the second and third MTP joints right greater than left.     We discussed treatment options and the midfoot is bothering her more than the forefoot.  However she is not ready to undergo surgical treatment at this time.  This with consist of midfoot fusion.    She may eventually need to have first MTP fusion as well as resection arthroplasties of the second and third possibly fourth metatarsals    We discussed continued nonoperative treatment options and we'll send her for power step orthotics to help unload the midfoot.  Also will have her apply Pennsaid twice daily to the dorsum of the midfoot.  Also demonstrated heel cord stretching exercises for her to do 4 or 5 times per day.  Discussed etiology of heel cord tightness to midfoot and forefoot overload.  Instruction sheet was provided and demonstrated for her.    Follow-up in 2 months x-rays of right foot if she's having persistent pain

## 2018-06-06 ENCOUNTER — OFFICE VISIT (OUTPATIENT)
Dept: ORTHOPEDIC SURGERY | Facility: CLINIC | Age: 58
End: 2018-06-06

## 2018-06-06 VITALS — BODY MASS INDEX: 27.38 KG/M2 | TEMPERATURE: 97.4 F | HEIGHT: 61 IN | WEIGHT: 145 LBS

## 2018-06-06 DIAGNOSIS — M19.079 ARTHRITIS OF FOOT: Primary | ICD-10-CM

## 2018-06-06 DIAGNOSIS — M79.671 BILATERAL FOOT PAIN: ICD-10-CM

## 2018-06-06 DIAGNOSIS — M79.672 BILATERAL FOOT PAIN: ICD-10-CM

## 2018-06-06 DIAGNOSIS — M77.42 METATARSALGIA OF BOTH FEET: ICD-10-CM

## 2018-06-06 DIAGNOSIS — M77.41 METATARSALGIA OF BOTH FEET: ICD-10-CM

## 2018-06-06 PROCEDURE — 73630 X-RAY EXAM OF FOOT: CPT | Performed by: ORTHOPAEDIC SURGERY

## 2018-06-06 PROCEDURE — 99213 OFFICE O/P EST LOW 20 MIN: CPT | Performed by: ORTHOPAEDIC SURGERY

## 2018-06-08 ENCOUNTER — DOCUMENTATION (OUTPATIENT)
Dept: ORTHOPEDIC SURGERY | Facility: CLINIC | Age: 58
End: 2018-06-08

## 2018-06-08 ENCOUNTER — TELEPHONE (OUTPATIENT)
Dept: ORTHOPEDIC SURGERY | Facility: CLINIC | Age: 58
End: 2018-06-08

## 2018-06-08 NOTE — PROGRESS NOTES
Left message on patient's listed number 3 times today to discuss her message.  Requested call back

## 2018-06-08 NOTE — TELEPHONE ENCOUNTER
----- Message from Yeny Anthony sent at 6/7/2018  4:04 PM EDT -----  Regarding: Visit Follow-Up Question  Contact: [phone]  MY CHART MESSAGE:  I'm sorry. I keep having more questions. Could a stress fracture or fractures be seen on the X-rays I had taken yesterday or only on Mri  just wondering if the increased pain could be related to that.    I do have osteoporosis so that concerns me too.  Hoping for a solution ! Thank you guys for everything   Tanna

## 2018-06-15 ENCOUNTER — DOCUMENTATION (OUTPATIENT)
Dept: ORTHOPEDIC SURGERY | Facility: CLINIC | Age: 58
End: 2018-06-15

## 2018-06-15 NOTE — PROGRESS NOTES
I tried multiple times again this week to contact the patient via the only number listed for her in order to answer the message that she sent to me in my chart and have requested call back

## 2018-06-21 ENCOUNTER — TELEPHONE (OUTPATIENT)
Dept: ORTHOPEDIC SURGERY | Facility: CLINIC | Age: 58
End: 2018-06-21

## 2018-06-22 NOTE — TELEPHONE ENCOUNTER
I have tried to call the patient back again multiple times over the last week and a half returning her message but the only number listed for her in the chart requesting a call back and have not heard back from her as of yet

## 2018-06-27 ENCOUNTER — TELEPHONE (OUTPATIENT)
Dept: ORTHOPEDIC SURGERY | Facility: CLINIC | Age: 58
End: 2018-06-27

## 2018-06-27 DIAGNOSIS — R52 PAIN: Primary | ICD-10-CM

## 2018-06-27 NOTE — TELEPHONE ENCOUNTER
----- Message from Yeny Anthony sent at 6/27/2018 10:21 AM EDT -----  Regarding: Non-Urgent Medical Question  Contact: [phone]  Hi   I’m sorry I keep missing your calls!! My work schedule is opposite everyone else.  Both of my feet are really hurting. I have not gotten any information on the topical that I got the samples of.  If I can get it called into my pharmacy that would be great .  Also I was wondering if I could have the guided injections in both feet  again. The pain is pretty awful.  I’ve lost a little weight  and trying to lose more to help the pain.  I have a decent pain threshold too but they are very painful. Both feet now.  I got the orthotics and the help some but the pain is really only controlled at  all by ibuprofen 800 mg st this point . I know they aren’t good to keep taking daily .  I can’t be off that long for surgery just yet !  Thank you

## 2018-07-06 ENCOUNTER — TELEPHONE (OUTPATIENT)
Dept: ORTHOPEDIC SURGERY | Facility: CLINIC | Age: 58
End: 2018-07-06

## 2018-07-06 NOTE — TELEPHONE ENCOUNTER
----- Message from Yeny Anthony sent at 7/6/2018 10:20 AM EDT -----  Regarding: Visit Follow-Up Question  Contact: [phone]  Hi there   Sorry I keep missing your calls. I will schedule an appointment. I’m still in a great deal of pain especially the right foot.   I believe I am going to be off from work during the day I either Tuesday Wednesday or Thursday this coming week and or the following also. If you can fit me in I can manage to be off   Let me know or you. An leans a message of the date and time.  Thank you so much for trying so hard to reach me !  Tanna

## 2018-07-06 NOTE — TELEPHONE ENCOUNTER
----- Message from Yeny Anthony sent at 7/6/2018 10:20 AM EDT -----  Regarding: Visit Follow-Up Question  Contact: [phone]  Hi there   Sorry I keep missing your calls. I will schedule an appointment. I’m still in a great deal of pain especially the right foot.   I believe I am going to be off from work during the day I either Tuesday Wednesday or Thursday this coming week and or the following also. If you can fit me in I can manage to be off   Let me know or you. An leans a message of the date and time.  Thank you so much for trying so hard to reach me !  Tanna

## 2018-07-11 ENCOUNTER — TELEPHONE (OUTPATIENT)
Dept: ORTHOPEDIC SURGERY | Facility: CLINIC | Age: 58
End: 2018-07-11

## 2018-07-11 NOTE — TELEPHONE ENCOUNTER
----- Message from Yeny Anthony sent at 7/10/2018 11:55 PM EDT -----  Regarding: Visit Follow-Up Question  Contact: [phone]  I. Can come in Thursday between 10 and 1  if you have abt time then to fit me in !!   Thank you

## 2018-07-11 NOTE — TELEPHONE ENCOUNTER
Is this correct? I didn't see it documented in the chart and I do not want to give her inaccurate information according to yesterdays message.

## 2018-07-11 NOTE — TELEPHONE ENCOUNTER
----- Message from Yeny Anthony sent at 7/11/2018  1:25 PM EDT -----  Regarding: Visit Follow-Up Question  Contact: [phone]  I can do 10 am. I got a message from him saying he had an emergent problem but could work me in at 1130. He left a voice mail today   Tanna.   Just let me know which one

## 2018-07-11 NOTE — TELEPHONE ENCOUNTER
Can you put the patient on the schedule for tomorrow at 11:30 per MWM. He changed appt time.    Thanks

## 2018-07-12 ENCOUNTER — OFFICE VISIT (OUTPATIENT)
Dept: ORTHOPEDIC SURGERY | Facility: CLINIC | Age: 58
End: 2018-07-12

## 2018-07-12 VITALS — WEIGHT: 145.4 LBS | BODY MASS INDEX: 27.45 KG/M2 | TEMPERATURE: 98.4 F | HEIGHT: 61 IN

## 2018-07-12 DIAGNOSIS — M20.11 HALLUX VALGUS OF RIGHT FOOT: ICD-10-CM

## 2018-07-12 DIAGNOSIS — M19.079 ARTHRITIS OF FOOT: Primary | ICD-10-CM

## 2018-07-12 PROCEDURE — 99214 OFFICE O/P EST MOD 30 MIN: CPT | Performed by: ORTHOPAEDIC SURGERY

## 2018-07-12 PROCEDURE — 73630 X-RAY EXAM OF FOOT: CPT | Performed by: ORTHOPAEDIC SURGERY

## 2018-07-12 RX ORDER — FLUTICASONE PROPIONATE 50 MCG
SPRAY, SUSPENSION (ML) NASAL
Refills: 1 | COMMUNITY
Start: 2018-06-27 | End: 2019-10-11

## 2018-07-12 NOTE — PROGRESS NOTES
"Foot Follow Up      Patient: Yeny Anthony    YOB: 1960 57 y.o. female    Chief Complaints: Foot pain    History of Present Illness: Patient was last seen on 6/6/18 bilateral midfoot arthritis as well as pelvis hallux valgus deformity with varus of the MTP joints on the right more so than left with arthritic change on the right.  At that time she was sent for power step orthotics which have helped to some degree and she feels like the Pennsaid helped but she never received any in the male and has used Voltaren gel with some relief in the past.  She gets relief now only with use of ibuprofen about 800 mg twice daily.  She called several weeks: I have called her multiple times and she was finally able to get back into the office and complains of moderate to severe intermittent in intensity stabbing pain in the dorsum of the right foot that is relieved some with use of ibuprofen.  She gets only mild discomfort in the left midfoot and really does not have significant pain in the forefoot bilaterally  HPI    ROS: Foot pain, no fevers chills chest pain or shortness of breath  Past Medical History:   Diagnosis Date   • Acid reflux    • Bundle branch block    • Depression    • High cholesterol    • History of migraine    • RHA (rheumatoid arthritis) (CMS/HCC)    • Seasonal affective disorder (CMS/HCC)    • Sleep apnea     mild   • Spinal headache     chavez monae fusion lumbar are  for delivery of child anesthesia attempted epidural above the fusion and resulted  in spinal headache   • Toxic condition due to an overly active thyroid gland      Physical Exam:   Vitals:    07/12/18 1208   Temp: 98.4 °F (36.9 °C)   TempSrc: Temporal Artery    Weight: 66 kg (145 lb 6.4 oz)   Height: 154.9 cm (61\")     Well developed with normal mood.On exam she had a nonantalgic gait.  There was mild to moderate discomfort over the dorsum of the right midfoot along the second and third more so than fourth and fifth TMT " joint.  There was valgus deformity of the first MTP as well as second and third but no irritability but limited motion.  Negative Tinel's over the deep peroneal nerve.  Nontender over the first TMT joint      Radiology: 3  views the right foot ordered to evaluate pain reviewed and compared with previous x-rays.  There is severe arthritic change of the second and third TMT joints as well as valgus deformity of the lesser MTP joints and the first with arthritic change especially of the first and second.  I don't see any acute changes      Assessment/Plan:  1.  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  Chronic bilateral hallux valgus with lesser toe valgus deformity right greater than left    We discussed treatment options she had injections done back in early March with some intermittent relief.    We discussed other treatment options and she's not yet ready to undergo fusion as she cannot take time off from work.    She'll continue with her stretching exercises and continue use of ibuprofen 800 mg twice daily with GI precautions and counseled her to speak with her primary care physician regarding long-term use of anti-inflammatories so that kidney and liver functions can be monitored.    We'll also see about getting her Pennsaid taking care of and if she doesn't qualify for this then she will let me know and will refill her Voltaren gel.    She was also fitted with a short boot to help limit midfoot motion to use for the next several weeks and if pain begins to improve she may start gradually weaning out of this    We'll send her for radiographically guided injections of her right second and third TMT joints and I will see her back in about 6 weeks.    Left foot is much less symptomatic and she'll continue with stretching exercises topical anti-inflammatories and inserts.

## 2018-07-25 ENCOUNTER — HOSPITAL ENCOUNTER (OUTPATIENT)
Dept: GENERAL RADIOLOGY | Facility: HOSPITAL | Age: 58
Discharge: HOME | End: 2018-07-25
Attending: ORTHOPAEDIC SURGERY | Admitting: ORTHOPAEDIC SURGERY

## 2018-07-25 DIAGNOSIS — M19.079 ARTHRITIS OF FOOT: ICD-10-CM

## 2018-07-25 PROCEDURE — 77002 NEEDLE LOCALIZATION BY XRAY: CPT

## 2018-07-25 PROCEDURE — 25010000002 METHYLPREDNISOLONE PER 125 MG: Performed by: RADIOLOGY

## 2018-07-25 PROCEDURE — 25010000002 IOPAMIDOL 61 % SOLUTION: Performed by: RADIOLOGY

## 2018-07-25 RX ORDER — LIDOCAINE HYDROCHLORIDE 10 MG/ML
10 INJECTION, SOLUTION INFILTRATION; PERINEURAL ONCE
Status: COMPLETED | OUTPATIENT
Start: 2018-07-25 | End: 2018-07-25

## 2018-07-25 RX ORDER — BUPIVACAINE HYDROCHLORIDE 2.5 MG/ML
10 INJECTION, SOLUTION EPIDURAL; INFILTRATION; INTRACAUDAL ONCE
Status: COMPLETED | OUTPATIENT
Start: 2018-07-25 | End: 2018-07-25

## 2018-07-25 RX ORDER — METHYLPREDNISOLONE SODIUM SUCCINATE 125 MG/2ML
80 INJECTION, POWDER, LYOPHILIZED, FOR SOLUTION INTRAMUSCULAR; INTRAVENOUS
Status: COMPLETED | OUTPATIENT
Start: 2018-07-25 | End: 2018-07-25

## 2018-07-25 RX ADMIN — IOPAMIDOL 1 ML: 612 INJECTION, SOLUTION INTRAVENOUS at 14:40

## 2018-07-25 RX ADMIN — BUPIVACAINE HYDROCHLORIDE 2 ML: 2.5 INJECTION, SOLUTION EPIDURAL; INFILTRATION; INTRACAUDAL; PERINEURAL at 14:40

## 2018-07-25 RX ADMIN — METHYLPREDNISOLONE SODIUM SUCCINATE 80 MG: 125 INJECTION, POWDER, LYOPHILIZED, FOR SOLUTION INTRAMUSCULAR; INTRAVENOUS at 14:40

## 2018-07-25 RX ADMIN — LIDOCAINE HYDROCHLORIDE 1 ML: 10 INJECTION, SOLUTION INFILTRATION; PERINEURAL at 14:40

## 2018-08-23 RX ORDER — MELOXICAM 15 MG/1
TABLET ORAL
Qty: 30 TABLET | Refills: 0 | OUTPATIENT
Start: 2018-08-23

## 2018-08-27 ENCOUNTER — TELEPHONE (OUTPATIENT)
Dept: ORTHOPEDIC SURGERY | Facility: CLINIC | Age: 58
End: 2018-08-27

## 2018-08-27 RX ORDER — MELOXICAM 15 MG/1
TABLET ORAL
Qty: 30 TABLET | Refills: 3 | Status: SHIPPED | OUTPATIENT
Start: 2018-08-27 | End: 2018-12-24 | Stop reason: SDUPTHER

## 2018-08-27 NOTE — TELEPHONE ENCOUNTER
----- Message from Yeny Anthony sent at 8/26/2018  7:20 AM EDT -----  Regarding: Non-Urgent Medical Question  Contact: [phone]  Hi there just wondering why melodically was denied.   I have an appointment in 2 weeks. I only take if needed. I have some left but just wondered   Thanks

## 2018-08-27 NOTE — TELEPHONE ENCOUNTER
Please let patient know that I do not know why meloxicam would be denied unless it was something to do with her insurance.  Please check into this and ask More if needed and let patient know thank you

## 2018-10-18 DIAGNOSIS — R52 PAIN: ICD-10-CM

## 2018-10-18 NOTE — TELEPHONE ENCOUNTER
----- Message from Yeny Anthony sent at 10/18/2018  7:02 AM EDT -----  Regarding: Prescription Question  Contact: [phone]  Hi there.    Could I get a refill of the diclofenac topical gel ?  My foot is getting more sore but not as bad as it was before the last set of injections.  I  in the process of moving. Will schedule an appointment as soon as I get moved Nov 1.    Thanks again

## 2018-12-10 ENCOUNTER — TELEPHONE (OUTPATIENT)
Dept: ORTHOPEDIC SURGERY | Facility: CLINIC | Age: 58
End: 2018-12-10

## 2018-12-10 NOTE — TELEPHONE ENCOUNTER
Left message for patient to call back - if / when patient calls (or responds via MyChart) please schedule patient for Tuesday 12/18 @ 8:00 for FU / INJ / R FOOT / per MWM. Thanks /srh

## 2018-12-10 NOTE — TELEPHONE ENCOUNTER
Regarding: Non-Urgent Medical Question  Contact: [phone]  ----- Message from Mychart, Generic sent at 12/10/2018 12:15 AM EST -----    Hi there!  Have an appointment scheduled tomorrow at 2:20   As much as I would like to come in my Mother passed away and I'm in [place] until the end of next week. I'm so sorry. this was unexpected . I am usually off Monday Tuesday Wednesday afternoons and can do any day after the 17th.  Sorry again  Tanna

## 2018-12-12 NOTE — TELEPHONE ENCOUNTER
Per verbal conversation with DAVE, Left message notifying patient DAVE can see her Thursday 12/20/18 @ 8:00am (NOT 12/18 - MWM in Surgery) for FU / INJ / R FOOT / RS from 12/10 // per DAVE. Thanks /srh

## 2018-12-27 RX ORDER — MELOXICAM 15 MG/1
TABLET ORAL
Qty: 30 TABLET | Refills: 3 | Status: SHIPPED | OUTPATIENT
Start: 2018-12-27 | End: 2019-10-11

## 2018-12-31 ENCOUNTER — TELEPHONE (OUTPATIENT)
Dept: ORTHOPEDIC SURGERY | Facility: CLINIC | Age: 58
End: 2018-12-31

## 2018-12-31 NOTE — TELEPHONE ENCOUNTER
Regarding: Non-Urgent Medical Question  Contact: [phone]  ----- Message from Mychart, Generic sent at 12/31/2018  5:21 AM EST -----    I am able to come in any Monday through Wednesday  now that I am home. I work nights so afternoon of after 10 is best for me to get there. Let me know what you have and I will for side be there. My foot is extremely painful right now !  Sincerely   Tanna

## 2019-01-09 ENCOUNTER — OFFICE VISIT (OUTPATIENT)
Dept: ORTHOPEDIC SURGERY | Facility: CLINIC | Age: 59
End: 2019-01-09

## 2019-01-09 VITALS — HEIGHT: 61 IN | WEIGHT: 145 LBS | BODY MASS INDEX: 27.38 KG/M2 | TEMPERATURE: 97.3 F

## 2019-01-09 DIAGNOSIS — M77.41 METATARSALGIA OF RIGHT FOOT: ICD-10-CM

## 2019-01-09 DIAGNOSIS — M06.9 RHEUMATOID ARTHRITIS, INVOLVING UNSPECIFIED SITE, UNSPECIFIED RHEUMATOID FACTOR PRESENCE: Primary | ICD-10-CM

## 2019-01-09 DIAGNOSIS — M19.079 ARTHRITIS OF FOOT: ICD-10-CM

## 2019-01-09 DIAGNOSIS — M20.11 HALLUX VALGUS OF RIGHT FOOT: ICD-10-CM

## 2019-01-09 DIAGNOSIS — M79.671 RIGHT FOOT PAIN: ICD-10-CM

## 2019-01-09 PROCEDURE — 73630 X-RAY EXAM OF FOOT: CPT | Performed by: ORTHOPAEDIC SURGERY

## 2019-01-09 PROCEDURE — 99214 OFFICE O/P EST MOD 30 MIN: CPT | Performed by: ORTHOPAEDIC SURGERY

## 2019-01-09 NOTE — PROGRESS NOTES
Foot Follow Up      Patient: Yeny Anthony    YOB: 1960 58 y.o. female    Chief Complaints: Foot pain    History of Present Illness:Patient was  seen on 6/6/18 for  bilateral midfoot arthritis as well as pelvis hallux valgus deformity with varus of the MTP joints on the right more so than left with arthritic change on the right.  At that time she was sent for power step orthotics.    She was last seen on 7/12/18 at which time she said that the orthotics  had helped to some degree and she felt like the Pennsaid  had helped but she never received any in the mil and had used Voltaren gel with some relief in the past.   that appointment she reported help with with use of ibuprofen about 800 mg twice daily.  She called several weeks prior to her last appointment and I had called her multiple times and she was finally able to get back into the office and she was seen in the office on 7/12/18 complained of moderate to severe intermittent in intensity stabbing pain in the dorsum of the right foot that is relieved some with use of ibuprofen.  She got only mild discomfort in the left midfoot and really does not have significant pain in the forefoot bilaterally    At that time she was instructed to continue with stretching exercises and use of 800 mg ibuprofen twice daily with instructions to check with her PCP about long-term use of anti-inflammatories.  We checked on getting the Pennsaid for her and she was fitted with a short boot limit motion for the next several weeks thereafter and was sent for radiographically guided injections of her right second and third TMT joints.  These were performed on 7/25/18.    She canceled multiple follow-up appointments throughout all and most recently had good to [place] because unfortunately her mother passed away.    She states today that those injections worked really well this time and she had at least 90% relief for greater than 3 months and has started to wear  "off with return of intermittent stabbing pain in the dorsum of the midfoot really not having significant pain in the forefoot today.  She also got orthotics from [facility] which she feels that help with her not in her shoes today.      HPI    ROS: Foot pain, no fevers chills chest pain or shortness of breath  Past Medical History:   Diagnosis Date   • Acid reflux    • Bundle branch block    • Depression    • High cholesterol    • History of migraine    • RHA (rheumatoid arthritis) (CMS/[facility])    • Seasonal affective disorder (CMS/[facility])    • Sleep apnea     mild   • Spinal headache     chavez monae fusion lumbar are  for delivery of child anesthesia attempted epidural above the fusion and resulted  in spinal headache   • Toxic condition due to an overly active thyroid gland      Physical Exam:   Vitals:    01/09/19 1053   Temp: 97.3 °F (36.3 °C)   Weight: 65.8 kg (145 lb)   Height: 154.9 cm (61\")     Well developed with normal mood.  Right foot shows mild discomfort over the dorsum of the midfoot at the second and third TMT joints but no warmth erythema.  There is valgus alignment to her first second third and fourth toes but really no significant discomfort swelling or irritability over the forefoot today.      Radiology: 3 views of the right foot ordered to evaluate pain reviewed and compared with previous x-rays.  Shows significant arthritic change of the midfoot especially the second and third TMT joint.  There is arthritic change the first second and third TMT joints varus alignment of the first second third and fourth MTP joints.      Assessment/Plan:  1.  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  Chronic bilateral hallux valgus with lesser toe valgus deformity right greater than left    Her left foot seems be doing well and she does not have any complaints regarding that today.  We discussed treatment options with her right foot and she is quite busy with work now and would like to continue " with injections rather than any type of surgical treatment.  We'll send her for repeat fluoroscopically guided injections of her right second and third TMT joints she will continue stretching at least 3-4 times a day and knee with her over-the-counter orthotic.    She's not seeing a rheumatologist in several years and would like a referral.  We'll send her to Dr. Goodrich.    I will see her back in 3 months x-rays of her feet if she's having pain.

## 2019-01-15 RX ORDER — CEPHALEXIN 500 MG/1
CAPSULE ORAL
Qty: 4 CAPSULE | Refills: 0 | Status: SHIPPED | OUTPATIENT
Start: 2019-01-15 | End: 2019-02-18 | Stop reason: SDUPTHER

## 2019-01-29 ENCOUNTER — HOSPITAL ENCOUNTER (OUTPATIENT)
Dept: GENERAL RADIOLOGY | Facility: HOSPITAL | Age: 59
Discharge: HOME | End: 2019-01-29
Attending: ORTHOPAEDIC SURGERY | Admitting: RADIOLOGY

## 2019-01-29 DIAGNOSIS — M19.079 ARTHRITIS OF FOOT: ICD-10-CM

## 2019-01-29 PROCEDURE — 25010000002 IOPAMIDOL 61 % SOLUTION: Performed by: RADIOLOGY

## 2019-01-29 PROCEDURE — 25010000002 METHYLPREDNISOLONE PER 125 MG: Performed by: RADIOLOGY

## 2019-01-29 PROCEDURE — 77002 NEEDLE LOCALIZATION BY XRAY: CPT

## 2019-01-29 RX ORDER — LIDOCAINE HYDROCHLORIDE 10 MG/ML
10 INJECTION, SOLUTION INFILTRATION; PERINEURAL ONCE
Status: COMPLETED | OUTPATIENT
Start: 2019-01-29 | End: 2019-01-29

## 2019-01-29 RX ORDER — BUPIVACAINE HYDROCHLORIDE 2.5 MG/ML
10 INJECTION, SOLUTION EPIDURAL; INFILTRATION; INTRACAUDAL ONCE
Status: COMPLETED | OUTPATIENT
Start: 2019-01-29 | End: 2019-01-29

## 2019-01-29 RX ORDER — METHYLPREDNISOLONE SODIUM SUCCINATE 125 MG/2ML
80 INJECTION, POWDER, LYOPHILIZED, FOR SOLUTION INTRAMUSCULAR; INTRAVENOUS
Status: COMPLETED | OUTPATIENT
Start: 2019-01-29 | End: 2019-01-29

## 2019-01-29 RX ADMIN — IOPAMIDOL 1 ML: 612 INJECTION, SOLUTION INTRAVENOUS at 11:40

## 2019-01-29 RX ADMIN — METHYLPREDNISOLONE SODIUM SUCCINATE 80 MG: 125 INJECTION, POWDER, LYOPHILIZED, FOR SOLUTION INTRAMUSCULAR; INTRAVENOUS at 11:40

## 2019-01-29 RX ADMIN — LIDOCAINE HYDROCHLORIDE 1 ML: 10 INJECTION, SOLUTION INFILTRATION; PERINEURAL at 11:40

## 2019-01-29 RX ADMIN — BUPIVACAINE HYDROCHLORIDE 2 ML: 2.5 INJECTION, SOLUTION EPIDURAL; INFILTRATION; INTRACAUDAL; PERINEURAL at 11:40

## 2019-02-18 RX ORDER — CEPHALEXIN 500 MG/1
CAPSULE ORAL
Qty: 12 CAPSULE | Refills: 0 | OUTPATIENT
Start: 2019-02-18 | End: 2021-01-19

## 2019-02-18 NOTE — TELEPHONE ENCOUNTER
RBB out all week and MLL out until Wednesday.  MJR, is this something you don't mind filling?    Regarding: Non-Urgent Medical Question  Contact: [phone]  ----- Message from Nicholas Haddox Records Generic sent at 2/16/2019  2:55 AM EST -----    Hi. I  am having dental work again in a week. Could you send in a script for antibiotics for me again for the knee replacements !  Thank you so much !  Tanna     Verified Results  COMP METABOLIC PANEL WITH LIPID PANEL (CPNL,LIPDPL) [id number] 12:01AM DARIELA PRESCOTT   [Dec 5, 2017 10:21PM DARIELA PRESCOTT]  blood test is good     Test Name Result Flag Reference   SODIUM 137 mmol/L  135-145   POTASSIUM 4.2 mmol/L  3.4-5.1   CHLORIDE 101 mmol/L     CARBON DIOXIDE 26 mmol/L  21-32   ANION GAP 14 mmol/L  10-20   GLUCOSE 86 mg/dl  65-99   BUN 13 mg/dl  6-20   CREATININE 1.10 mg/dl  0.67-1.17   GFR EST. AMER 90     eGFR results = or >90 mL/min/1.73m2 = Normal kidney function.   GFR EST.NONAFRI AMER 77     eGFR 60 - 89 mL/min/1.73m2 = Mild decrease in kidney function.   BUN/CREATININE RATIO 12  7-25   BILIRUBIN TOTAL 1.0 mg/dl  0.2-1.0   GOT/AST 29 Units/L  <38   ALKALINE PHOSPHATASE 65 Units/L     ALBUMIN 4.3 g/dl  3.6-5.1   TOTAL PROTEIN 7.6 g/dl  6.4-8.2   GLOBULIN (CALCULATED) 3.3 g/dl  2.0-4.0   A/G RATIO 1.3  1.0-2.4   CALCIUM 9.2 mg/dl  8.4-10.2   GPT/ALT 31 Units/L  <79   FASTING STATUS UNKNOWN hrs     CHOLESTEROL 182 mg/dl  <200   Desirable            <200  Borderline High      200 to 239  High                 >=240   HDL CHOLESTEROL 62 mg/dl  >39   Low            <40  Borderline Low 40 to 49  Near Optimal   50 to 59  Optimal        >=60   TRIGLYCERIDES 144 mg/dl  <150   Normal                   <150  Borderline High          150 to 199  High                     200 to 499  Very High                >=500   LDL CHOLESTEROL (CALCULATED) 91 mg/dl  <130   OPTIMAL               <100  NEAR OPTIMAL          100-129  BORDERLINE HIGH       130-159  HIGH                  160-189  VERY HIGH             >=190   NON-HDL CHOLESTEROL 120 mg/dl     Therapeutic Target:  CHD and risk equivalents <130  Multiple risk factors    <160  0 to 1 risk factors      <190   CHOLESTEROL/HDL RATIO 2.9  <4.5   FASTING STATUS UNKNOWN hrs       PSA - PROSTATE SPECIFIC AG [id number] 12:01AM DARIELA PRESCOTT   [Dec 5, 2017 10:21PM DARIELA PRESCOTT]  blood test is good     Test Name Result Flag  Reference   PROSTATE SPECIFIC AG 1.68 ng/ml  <4.01   SUGGESTED AGE SPECIFIC REFERENCE RANGES     AGE                NG/ML  40-49              0.01-2.50  50-59              0.01-3.50  60-69              0.01-4.50  70-79              0.01-6.50     REFERENCE: JOURNAL OF UROLOGY 155, [id number]     [employer]       Message   blood test is good

## 2019-03-15 ENCOUNTER — TELEPHONE (OUTPATIENT)
Dept: ORTHOPEDIC SURGERY | Facility: CLINIC | Age: 59
End: 2019-03-15

## 2019-03-15 NOTE — TELEPHONE ENCOUNTER
I have contacted Dr. Purdy, Dr. Elliott, and Dr. Frausto's office and none of them participate with [employer] insurance.  The only rheumatologist that I could find was [facility].  The office made a referral to Dr. Simpson and the information was faxed to their office.  I contacted the office they do have the information however Dr. Simpson is no longer taking any new patients however they have for other physicians that can see the patient.  Unfortunately the patient is supposed to contact their office to make the appointment and our office never gave her that information.  I have left a message again for the patient to contact me at the office so I can give her the address and phone number so she can call and make her appointment

## 2019-03-15 NOTE — TELEPHONE ENCOUNTER
Can you please call her about this.  Referred to Dr Goodrich. Please see if she can get I with Dr Janay Goodrich or Farhan Thomas, if not then please refer to Lexi Frausto, or Rajwinder, thanks so much

## 2019-03-15 NOTE — TELEPHONE ENCOUNTER
Regarding: RE: RE: Referral Request  Contact: [phone]  ----- Message from Mychart, Generic sent at 3/15/2019  2:12 AM EDT -----    Hi there. Just checking in on the rheumatologist referral!  Any luck finding one?  Really struggling with hands and feet right now   Thanks for trying   Woody Anthony     ----- Message -----  From: BETSY GONZALEZ  Sent: 2/1/19, 8:14 AM  To: WOODY Anthony  Subject: RE: Referral Request    Woody,    The referral has been faxed, they have to review your records and will get back with our records once they get an appt. The one that was chosen looks like it doesn't accept your insurance, so we are currently looking for another provider who does take your insurance.    Charlotte Hinojosa    ----- Message -----     From: WOODY Anthony     Sent: 2/1/2019  2:11 AM EST       To: Cornelio Stanley MD  Subject: Referral Request    Hi. I was just messaging to see if you had remembered to try to get me into a rheumatologist.    I know it may take some time.   Thank you   The injections really really helped again !!!  Thanks   Woody

## 2019-03-18 NOTE — TELEPHONE ENCOUNTER
Have again attempted to contact the patient regarding her rheumatology appointment.  I have left her message that [facility] does participate with [employer] however their office policy is for the patient to contact their office herself in order to make the appointment.  I have given her the office phone number and left it open for her to call me at the office if she has any other questions

## 2019-04-03 RX ORDER — CEPHALEXIN 500 MG/1
CAPSULE ORAL
Qty: 12 CAPSULE | Refills: 0 | OUTPATIENT
Start: 2019-04-03

## 2019-04-03 NOTE — TELEPHONE ENCOUNTER
Please call and make sure patient is taking these appropriately as I sent in a refill 6 weeks ago with enough capsules to cover 3 dental visits.  If she is taking appropriately, I will refill.  Thanks ISRAEL

## 2019-04-25 ENCOUNTER — OFFICE VISIT (OUTPATIENT)
Dept: ORTHOPEDIC SURGERY | Facility: CLINIC | Age: 59
End: 2019-04-25

## 2019-04-25 VITALS — BODY MASS INDEX: 28.7 KG/M2 | WEIGHT: 152 LBS | HEIGHT: 61 IN | TEMPERATURE: 97.9 F

## 2019-04-25 DIAGNOSIS — M19.079 ARTHRITIS OF FOOT: Primary | ICD-10-CM

## 2019-04-25 DIAGNOSIS — M06.9 RHEUMATOID ARTHRITIS, INVOLVING UNSPECIFIED SITE, UNSPECIFIED RHEUMATOID FACTOR PRESENCE: ICD-10-CM

## 2019-04-25 DIAGNOSIS — M79.671 BILATERAL FOOT PAIN: ICD-10-CM

## 2019-04-25 DIAGNOSIS — M77.42 METATARSALGIA OF BOTH FEET: ICD-10-CM

## 2019-04-25 DIAGNOSIS — M77.41 METATARSALGIA OF BOTH FEET: ICD-10-CM

## 2019-04-25 DIAGNOSIS — M20.11 HALLUX VALGUS OF RIGHT FOOT: ICD-10-CM

## 2019-04-25 DIAGNOSIS — M79.672 BILATERAL FOOT PAIN: ICD-10-CM

## 2019-04-25 PROCEDURE — 99214 OFFICE O/P EST MOD 30 MIN: CPT | Performed by: ORTHOPAEDIC SURGERY

## 2019-04-25 PROCEDURE — 73630 X-RAY EXAM OF FOOT: CPT | Performed by: ORTHOPAEDIC SURGERY

## 2019-04-25 NOTE — PROGRESS NOTES
"Foot Follow Up      Patient: WOODY Anthony    YOB: 1960 58 y.o. female    Chief Complaints: Foot pain    History of Present Illness: Patient has been followed extensively for bilateral midfoot arthritis as well as hallux valgus deformities with varus alignment of the MTP joints and arthritic change at the second MTP joint more so than on the right.    She was last seen on 1/9/2019 and was really not having significant pain in either forefoot or in the left foot.  She was sent for radiographic injections of the right second and third TMT joints  and instructed to continue with orthotics and stretching.  Injections were done on 1/29/2019    She states she had at least 80 to 90% relief of the right midfoot and has recently begun to have some slight recurrence of intermittent aching pain in the right midfoot.  Minimal discomfort in the left midfoot.  She not having any pain in the forefoot bilaterally.    She is also been referred for rheumatologic follow-up he does not have an appointment until October.  HPI    ROS: Foot pain  Past Medical History:   Diagnosis Date   • Acid reflux    • Bundle branch block    • Depression    • High cholesterol    • History of migraine    • RHA (rheumatoid arthritis) (CMS/HCC)    • Seasonal affective disorder (CMS/HCC)    • Sleep apnea     mild   • Spinal headache     chavez monae fusion lumbar are  for delivery of child anesthesia attempted epidural above the fusion and resulted  in spinal headache   • Toxic condition due to an overly active thyroid gland      Physical Exam:   Vitals:    04/25/19 1104   Temp: 97.9 °F (36.6 °C)   Weight: 68.9 kg (152 lb)   Height: 154.9 cm (61\")     Well developed with normal mood.  On exam there is mild discomfort of the dorsum of the midfoot right much more so than left.  There is valgus alignment to the first MTP joint right foot more so than left with valgus alignment of the lesser MTP joints but no focal discomfort with range " of motion or gross instability.  Neutral dorsiflexion heel cord bilaterally      Radiology: 3 views 3 views of both feet ordered to evaluate pain reviewed and compared to previous x-rays.  Both feet show moderate arthritic change right greater than left at the second and third TMT joints especially.  There is valgus deformity right greater than left as well as valgus alignment of the lesser metatarsal phalangeal joints right greater than left with some degenerative change at the right second MTP joint.    Assessment/Plan:  1.  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  Chronic bilateral hallux valgus with lesser toe valgus deformity right greater than left    We discussed treatment options and both feet are not bothering her enough to do anything from a surgical standpoint.  Left foot is much less bothersome than the right and she had good relief with injections.    We discussed treatment options going forward and I am encouraged that she had good relief from the injections which bodes well that she may do well with fusion of the second and third TMT joints but she does not want to do this to may be sometime later in the summer or early fall.    She will continue with stretching exercises use of arch supports and will send her for repeat injections of the right second and third TMT joint.    I will see her back in 2 months with x-rays of the right foot

## 2019-05-08 ENCOUNTER — HOSPITAL ENCOUNTER (OUTPATIENT)
Dept: GENERAL RADIOLOGY | Facility: HOSPITAL | Age: 59
End: 2019-05-08

## 2019-05-09 ENCOUNTER — TELEPHONE (OUTPATIENT)
Dept: ORTHOPEDIC SURGERY | Facility: CLINIC | Age: 59
End: 2019-05-09

## 2019-05-09 ENCOUNTER — HOSPITAL ENCOUNTER (OUTPATIENT)
Dept: GENERAL RADIOLOGY | Facility: HOSPITAL | Age: 59
Discharge: HOME | End: 2019-05-09
Admitting: RADIOLOGY

## 2019-05-09 DIAGNOSIS — M19.079 ARTHRITIS OF FOOT: ICD-10-CM

## 2019-05-09 PROCEDURE — 25010000002 IOPAMIDOL 61 % SOLUTION: Performed by: RADIOLOGY

## 2019-05-09 PROCEDURE — 77002 NEEDLE LOCALIZATION BY XRAY: CPT

## 2019-05-09 PROCEDURE — 25010000002 METHYLPREDNISOLONE PER 125 MG: Performed by: RADIOLOGY

## 2019-05-09 RX ORDER — LIDOCAINE HYDROCHLORIDE 10 MG/ML
10 INJECTION, SOLUTION INFILTRATION; PERINEURAL ONCE
Status: COMPLETED | OUTPATIENT
Start: 2019-05-09 | End: 2019-05-09

## 2019-05-09 RX ORDER — BUPIVACAINE HYDROCHLORIDE 2.5 MG/ML
10 INJECTION, SOLUTION EPIDURAL; INFILTRATION; INTRACAUDAL ONCE
Status: COMPLETED | OUTPATIENT
Start: 2019-05-09 | End: 2019-05-09

## 2019-05-09 RX ORDER — METHYLPREDNISOLONE SODIUM SUCCINATE 125 MG/2ML
80 INJECTION, POWDER, LYOPHILIZED, FOR SOLUTION INTRAMUSCULAR; INTRAVENOUS
Status: COMPLETED | OUTPATIENT
Start: 2019-05-09 | End: 2019-05-09

## 2019-05-09 RX ADMIN — METHYLPREDNISOLONE SODIUM SUCCINATE 80 MG: 125 INJECTION, POWDER, LYOPHILIZED, FOR SOLUTION INTRAMUSCULAR; INTRAVENOUS at 14:13

## 2019-05-09 RX ADMIN — BUPIVACAINE HYDROCHLORIDE 3 ML: 2.5 INJECTION, SOLUTION EPIDURAL; INFILTRATION; INTRACAUDAL; PERINEURAL at 14:13

## 2019-05-09 RX ADMIN — IOPAMIDOL 1 ML: 612 INJECTION, SOLUTION INTRAVENOUS at 14:13

## 2019-05-09 RX ADMIN — LIDOCAINE HYDROCHLORIDE 2 ML: 10 INJECTION, SOLUTION INFILTRATION; PERINEURAL at 14:13

## 2019-05-09 NOTE — TELEPHONE ENCOUNTER
Regarding: Visit Follow-Up Question  Contact: [phone]  ----- Message from Mychart, Generic sent at 5/9/2019  5:34 AM EDT -----    Hi there  I’ve been up all night with very intense pain in my right foot and  ankle . Feels like I almost have restless leg syndrome to go along with it ! I go for the injections tomorrow. I  haven’t had pain like this before.   Do you have any suggestions as to what may be causing this or what I can do for it ?  I spied it in hot tub , did a lidocaine patch on it. Wrapped it and it’s still extremely painful. Could I have tendinitis or something too?   Praying the injections work. May end up in surgery sooner than we talked about. I can’t go on like this very long .

## 2019-05-10 ENCOUNTER — TELEPHONE (OUTPATIENT)
Dept: ORTHOPEDIC SURGERY | Facility: CLINIC | Age: 59
End: 2019-05-10

## 2019-05-10 NOTE — TELEPHONE ENCOUNTER
Regarding: Visit Follow-Up Question  Contact: [phone]  ----- Message from Mychart, Generic sent at 5/10/2019  3:11 AM EDT -----    Delmar Erazo     I had the injections. They were excruciatingly painful this time .  They had never been anything close to this today but they seem to be helping now.  I just left work and so far the pain is better. It feels sore and bruised now but  nothing like last night . If this wears off too soon I really will ha e to seek other solutions  like surgery ! The pain was horrid last night and I have a high tolerance for pain !!  Thanks for trying to call me. I hobbled and say at a desk at my job today.  I have a follow up in a month or so.  We can talk about it then. Thank you so much. I’ll contact you if the intense pain returns   Tanna Anthony

## 2019-05-10 NOTE — TELEPHONE ENCOUNTER
Please let her know that I got her message and tried to call several times. Make sure that she really limits her activity for at least a week, ice her foot for 15-20 min 3x /day and use shoes with arch support and no barefoot ambulation and I will see her at her scheduled f/u appt but if anything worsens to let me know and we can try a different NSAID than the mobic, thanks

## 2019-06-07 ENCOUNTER — TELEPHONE (OUTPATIENT)
Dept: ORTHOPEDIC SURGERY | Facility: CLINIC | Age: 59
End: 2019-06-07

## 2019-06-07 NOTE — TELEPHONE ENCOUNTER
Regarding: Non-Urgent Medical Question  Contact: [phone]  ----- Message from [employer], Generic sent at 6/7/2019  8:09 AM EDT -----    Hi. I have ripped something in my right shoulder. I really need to see Dr Persaud as soon as I possibly can. Can barely use my right arm     Thanks   Tanna Anthony

## 2019-06-13 ENCOUNTER — OFFICE VISIT (OUTPATIENT)
Dept: ORTHOPEDIC SURGERY | Facility: CLINIC | Age: 59
End: 2019-06-13

## 2019-06-13 VITALS — BODY MASS INDEX: 29.45 KG/M2 | WEIGHT: 150 LBS | TEMPERATURE: 97.8 F | HEIGHT: 60 IN

## 2019-06-13 DIAGNOSIS — M25.511 ACUTE PAIN OF RIGHT SHOULDER: Primary | ICD-10-CM

## 2019-06-13 DIAGNOSIS — M75.101 TEAR OF RIGHT ROTATOR CUFF, UNSPECIFIED TEAR EXTENT: ICD-10-CM

## 2019-06-13 PROCEDURE — 99213 OFFICE O/P EST LOW 20 MIN: CPT | Performed by: ORTHOPAEDIC SURGERY

## 2019-06-13 PROCEDURE — 73030 X-RAY EXAM OF SHOULDER: CPT | Performed by: ORTHOPAEDIC SURGERY

## 2019-06-13 NOTE — PROGRESS NOTES
Shoulder Follow Up      Patient: WOODY Anthony        YOB: 1960            Chief Complaints: right Shoulder pain  Chief Complaint   Patient presents with   • Right Shoulder - Follow-up, Pain           History of Present Illness: Patient is here follow-up of right shoulder pain she states she was doing great however      Physical Exam: 58 y.o. female  General Appearance:    Alert, cooperative, in no acute distress                 There were no vitals filed for this visit.     Patient is alert and read ×3 no acute distress appears her above-listed at height weight and age.  Affect is normal respiratory rate is normal unlabored. Heart rate regular rate rhythm, sclera, dentition and hearing are normal for the purpose of this exam.      Ortho Exam              Assessment/Plan:          Large Joint Arthrocentesis: R subacromial bursa  Date/Time: 6/13/2019 3:01 PM  Consent given by: patient  Site marked: site marked  Timeout: Immediately prior to procedure a time out was called to verify the correct patient, procedure, equipment, support staff and site/side marked as required   Supporting Documentation  Indications: pain   Procedure Details  Location: shoulder - R subacromial bursa  Preparation: Patient was prepped and draped in the usual sterile fashion  Needle gauge: 21.  Approach: posterior  Medications administered: 80 mg methylPREDNISolone acetate 80 MG/ML; 4 mL lidocaine PF 1% 1 %  Patient tolerance: patient tolerated the procedure well with no immediate complications

## 2019-06-14 PROCEDURE — 20610 DRAIN/INJ JOINT/BURSA W/O US: CPT | Performed by: ORTHOPAEDIC SURGERY

## 2019-06-14 RX ORDER — METHYLPREDNISOLONE ACETATE 80 MG/ML
80 INJECTION, SUSPENSION INTRA-ARTICULAR; INTRALESIONAL; INTRAMUSCULAR; SOFT TISSUE
Status: COMPLETED | OUTPATIENT
Start: 2019-06-14 | End: 2019-06-14

## 2019-06-14 RX ORDER — LIDOCAINE HYDROCHLORIDE 10 MG/ML
4 INJECTION, SOLUTION EPIDURAL; INFILTRATION; INTRACAUDAL; PERINEURAL
Status: COMPLETED | OUTPATIENT
Start: 2019-06-14 | End: 2019-06-14

## 2019-06-14 RX ADMIN — METHYLPREDNISOLONE ACETATE 80 MG: 80 INJECTION, SUSPENSION INTRA-ARTICULAR; INTRALESIONAL; INTRAMUSCULAR; SOFT TISSUE at 15:38

## 2019-06-14 RX ADMIN — LIDOCAINE HYDROCHLORIDE 4 ML: 10 INJECTION, SOLUTION EPIDURAL; INFILTRATION; INTRACAUDAL; PERINEURAL at 15:38

## 2019-06-14 NOTE — PROGRESS NOTES
New Shoulder      Patient: WOODY Anthony        YOB: 1960    Medical Record Number: [id number]        Chief Complaints: Right shoulder pain      History of Present Illness: This is a 58-year-old female have seen over a year ago for right shoulder pain she was doing great until recently she was moving her dad up from [place] was doing a lot of lifting and then he lost his balance once that she had to catch him so she had severe right shoulder pain since that time.  She has pain with overhead activity pain is moderate intermittent night pain worse with activity somewhat better with rest her past medical history smart for bundle branch block depression high cholesterol migraine RA sleep apnea and acid reflux      Allergies:   Allergies   Allergen Reactions   • Adhesive Tape Itching   • Codeine Itching   • Flu Virus Vaccine Nausea Only and Other (See Comments)     Severe myalgias, fevery, FLIP       Medications:   Home Medications:  Current Outpatient Medications on File Prior to Visit   Medication Sig   • buPROPion SR (WELLBUTRIN SR) 150 MG 12 hr tablet Take 150 mg by mouth Daily.   • CALCIUM CARBONATE PO Take 1,000 mg by mouth Daily.   • cephalexin (KEFLEX) 500 MG capsule Take 4 caps one hour prior to dental procedure   • diclofenac (VOLTAREN) 1 % gel gel Apply 4 g topically to the appropriate area as directed 4 (Four) Times a Day As Needed (pain).   • fluticasone (FLONASE) 50 MCG/ACT nasal spray SHAKE LQ AND U 1 SPR IEN D   • meloxicam (MOBIC) 15 MG tablet 1 PO Daily with food.   • pantoprazole (PROTONIX) 40 MG EC tablet Take 40 mg by mouth every morning.   • pravastatin (PRAVACHOL) 10 MG tablet Take 10 mg by mouth Daily.     No current facility-administered medications on file prior to visit.      Current Medications:  Scheduled Meds:  Continuous Infusions:  No current facility-administered medications for this visit.   PRN Meds:.    Past Medical History:   Diagnosis Date   • Acid reflux    •  "Bundle branch block    • Depression    • High cholesterol    • History of migraine    • RHA (rheumatoid arthritis) (CMS/HCC)    • Seasonal affective disorder (CMS/HCC)    • Sleep apnea     mild   • Spinal headache     chavez monae fusion lumbar are  for delivery of child anesthesia attempted epidural above the fusion and resulted  in spinal headache   • Toxic condition due to an overly active thyroid gland         Past Surgical History:   Procedure Laterality Date   • BREAST ABSCESS INCISION AND DRAINAGE     • KNEE ARTHROPLASTY UNICOMPARTMENTAL Right 6/24/2016    Procedure: RT KNEE ARTHROPLASTY UNICOMPARTMENTAL;  Surgeon: Gamal Correa MD;  Location: [facility];  Service:    • KNEE ARTHROPLASTY UNICOMPARTMENTAL Left 8/25/2017    Procedure: KNEE ARTHROPLASTY UNICOMPARTMENTAL;  Surgeon: Gamal Correa MD;  Location: [facility];  Service:    • KNEE SURGERY Left     arthroscopic   • SPINE SURGERY      chavez monae fusion         Social History     Occupational History   • Not on file   Tobacco Use   • Smoking status: Never Smoker   • Smokeless tobacco: Never Used   Substance and Sexual Activity   • Alcohol use: No   • Drug use: No   • Sexual activity: Defer    Social History     Social History Narrative   • Not on file        Family History   Problem Relation Age of Onset   • Malig Hyperthermia Neg Hx              Review of Systems: 14 point review of systems are remarkable for the shoulder pain only the remainder negative per the patient    Review of Systems      Physical Exam: 58 y.o. female  General Appearance:    Alert, cooperative, in no acute distress                 Vitals:    06/13/19 1428   Temp: 97.8 °F (36.6 °C)   TempSrc: Temporal   Weight: 68 kg (150 lb)   Height: 152.4 cm (60\")      Patient is alert and read ×3 no acute distress appears her above-listed at height weight and age.  Affect is normal respiratory rate is normal unlabored. Heart rate regular rate rhythm, sclera, dentition and hearing " are normal for the purpose of this exam.    Ortho Exam Physical exam of the right shoulder reveals no overlying skin changes no lymphedema no lymphadenopathy.  Patient has active flexion 180 with mild symptoms abduction is similar external rotation is to 50 and internal rotation to the upper lumbar spine with mild symptoms.  Patient has good rotator cuff strength 4+ over 5 with isometric strength testing with pain.  Patient has a positive impingement and a positive Simmons sign.  Patient has good cervical range of motion which is full and asymptomatic no radicular symptoms.  Patient has a normal elbow exam.  Good distal pulses are present  Patient has pain with overhead activity and a positive Neer sign and a positive empty can sign , a positive drop arm and a definitive painful arc    Large Joint Arthrocentesis: R subacromial bursa  Date/Time: 6/14/2019 3:38 PM  Consent given by: patient  Site marked: site marked  Timeout: Immediately prior to procedure a time out was called to verify the correct patient, procedure, equipment, support staff and site/side marked as required   Supporting Documentation  Indications: pain   Procedure Details  Location: shoulder - R subacromial bursa  Preparation: Patient was prepped and draped in the usual sterile fashion  Needle gauge: 21.  Approach: posterior  Medications administered: 80 mg methylPREDNISolone acetate 80 MG/ML; 4 mL lidocaine PF 1% 1 %  Patient tolerance: patient tolerated the procedure well with no immediate complications                Radiology:   AP, Scapular Y and Axillary Lateral of the right shoulder were ordered/reviewed to evauate shoulder pain.  I have compared to previous films she does have some osteopenia some acromioclavicular arthritis no other acute bony pathology is seen  Imaging Results (most recent)     Procedure Component Value Units Date/Time    XR Shoulder 2+ View Right [[id number]] Resulted:  06/13/19 1443     Updated:  06/13/19 1443     Impression:       Ordering physician's impression is located in the Encounter Note dated 06/13/19. X-ray performed in the DR room.          Assessment/Plan: Right shoulder pain following a recent injury I would be concerned about rotator cuff this point plan is to proceed with an injection as a diagnostic and therapeutic tool she fails to improve with that would pursue other means of testing  Cortisone Injection. See procedure note.  Cortisone Injection for DIAGNOSTIC and THERAPUTIC purposes.

## 2019-07-01 ENCOUNTER — TELEPHONE (OUTPATIENT)
Dept: ORTHOPEDIC SURGERY | Facility: CLINIC | Age: 59
End: 2019-07-01

## 2019-07-01 DIAGNOSIS — M75.101 TEAR OF RIGHT ROTATOR CUFF, UNSPECIFIED TEAR EXTENT: Primary | ICD-10-CM

## 2019-07-01 NOTE — TELEPHONE ENCOUNTER
Regarding: Non-Urgent Medical Question  Contact: [phone]  ----- Message from Mychart, Generic sent at 6/30/2019 10:21 PM EDT -----  MY CHART MESSAGE:    Delmar Persaud  My right shoulder is extremely painful. I can barely move it honestly. I really need to get the MRI and if necessary talk about surgery as soon as possible.   I'm off of work Mon through Wed and just a reminder I have to have a 1.5 magnet for MRI due to my Childers monae!     Thanks for all  Tanna

## 2019-07-08 ENCOUNTER — TELEPHONE (OUTPATIENT)
Dept: ORTHOPEDIC SURGERY | Facility: CLINIC | Age: 59
End: 2019-07-08

## 2019-07-08 NOTE — TELEPHONE ENCOUNTER
Regarding: RE: Non-Urgent Medical Question  Contact: [phone]  ----- Message from Mychart, Generic sent at 7/3/2019  6:21 PM EDT -----    Yea I already take both for arthritis pain daily. Not touching it. Ok   Thanks   ----- Message -----  From: Raquel E  Sent: 7/3/2019  1:20 PM EDT  To: WOODY Anthony  Subject: RE: Non-Urgent Medical Question  Candace,  You can take Tylenol or Ibuprofen.    ----- Message -----     From: WOODY Anthony     Sent: 7/3/2019 10:59 AM EDT       To: Anum Persaud MD  Subject: RE: Non-Urgent Medical Question    Thank you.   Is there anything in the mean time I can do for pain relief   Woody   ----- Message -----  From: Raquel E  Sent: 7/1/2019  1:11 PM EDT  To: WOODY Anthony  Subject: RE: Non-Urgent Medical Question  Dr. Hung Maria put an order in for an MRI. They will call you to get scheduled.  Thanks    ----- Message -----     From: WOODY Anthony     Sent: 6/30/2019 10:21 PM EDT       To: Anum Persaud MD  Subject: Non-Urgent Medical Question    Delmar Persaud  My right shoulder is extremely painful. I can barely move it honestly. I really need to get the MRI and if necessary talk about surgery as soon as possible.   I'm off of work Mon through Wed and just a reminder I have to have a 1.5 magnet for MRI due to my Childers monae!     Thanks for all  Woody

## 2019-07-17 ENCOUNTER — HOSPITAL ENCOUNTER (OUTPATIENT)
Dept: MRI IMAGING | Facility: HOSPITAL | Age: 59
Discharge: HOME | End: 2019-07-17
Admitting: ORTHOPAEDIC SURGERY

## 2019-07-17 DIAGNOSIS — M75.101 TEAR OF RIGHT ROTATOR CUFF, UNSPECIFIED TEAR EXTENT: ICD-10-CM

## 2019-07-17 PROCEDURE — 73221 MRI JOINT UPR EXTREM W/O DYE: CPT

## 2019-07-18 ENCOUNTER — TELEPHONE (OUTPATIENT)
Dept: ORTHOPEDIC SURGERY | Facility: CLINIC | Age: 59
End: 2019-07-18

## 2019-07-18 DIAGNOSIS — M19.071 ARTHRITIS OF FOOT, RIGHT: Primary | ICD-10-CM

## 2019-07-18 NOTE — TELEPHONE ENCOUNTER
Regarding: Non-Urgent Medical Question  Contact: [phone]  ----- Message from Mychart, Generic sent at 7/18/2019  1:21 AM EDT -----  MY CHART MESSAGE:    Hi there. I’m coming in to you on the 25. I’m over the three month kitty on the injections for my midfoot pain.it is beginning  to hurt badly again. Could you possibly start the process of getting another injection. Lined up please so it doesn’t get to the-excruciating point this time . I would greatly appreciate it  See you in a week Tanna

## 2019-07-19 DIAGNOSIS — M75.101 TEAR OF RIGHT ROTATOR CUFF, UNSPECIFIED TEAR EXTENT: Primary | ICD-10-CM

## 2019-07-19 DIAGNOSIS — M25.511 ACUTE PAIN OF RIGHT SHOULDER: ICD-10-CM

## 2019-07-22 ENCOUNTER — TELEPHONE (OUTPATIENT)
Dept: ORTHOPEDIC SURGERY | Facility: CLINIC | Age: 59
End: 2019-07-22

## 2019-07-22 NOTE — TELEPHONE ENCOUNTER
If I  have not formally seen her for her left shoulder I cannot order an MRI.  I think it would be a good idea to be proactive so let us get her in for x-rays exam and then possibly an MRI

## 2019-07-22 NOTE — TELEPHONE ENCOUNTER
Regarding: Non-Urgent Medical Question  Contact: [phone]  ----- Message from Mychart, Generic sent at 7/19/2019  8:47 PM EDT -----  MY CHART MESSAGE:    Delmar Persaud  I was wondering if I should have the left shoulder MRI done also so I possibly could catch it earlier than the right in hopes I could have a more simple repair.   Let me know what you think. Since I cannot use my right arm due to pain , my left shoulder is hurting more and more.   Thanks for all  Tanna

## 2019-07-24 ENCOUNTER — HOSPITAL ENCOUNTER (OUTPATIENT)
Dept: GENERAL RADIOLOGY | Facility: HOSPITAL | Age: 59
Discharge: HOME | End: 2019-07-24
Admitting: ORTHOPAEDIC SURGERY

## 2019-07-24 DIAGNOSIS — M19.071 ARTHRITIS OF FOOT, RIGHT: ICD-10-CM

## 2019-07-24 PROCEDURE — 25010000003 LIDOCAINE 1 % SOLUTION: Performed by: RADIOLOGY

## 2019-07-24 PROCEDURE — 77002 NEEDLE LOCALIZATION BY XRAY: CPT

## 2019-07-24 PROCEDURE — 25010000002 METHYLPREDNISOLONE PER 40 MG: Performed by: RADIOLOGY

## 2019-07-24 PROCEDURE — 25010000002 IOPAMIDOL 61 % SOLUTION: Performed by: RADIOLOGY

## 2019-07-24 RX ORDER — LIDOCAINE HYDROCHLORIDE 10 MG/ML
10 INJECTION, SOLUTION INFILTRATION; PERINEURAL ONCE
Status: COMPLETED | OUTPATIENT
Start: 2019-07-24 | End: 2019-07-24

## 2019-07-24 RX ORDER — METHYLPREDNISOLONE ACETATE 40 MG/ML
40 INJECTION, SUSPENSION INTRA-ARTICULAR; INTRALESIONAL; INTRAMUSCULAR; SOFT TISSUE ONCE
Status: COMPLETED | OUTPATIENT
Start: 2019-07-24 | End: 2019-07-24

## 2019-07-24 RX ORDER — BUPIVACAINE HYDROCHLORIDE 2.5 MG/ML
10 INJECTION, SOLUTION EPIDURAL; INFILTRATION; INTRACAUDAL ONCE
Status: COMPLETED | OUTPATIENT
Start: 2019-07-24 | End: 2019-07-24

## 2019-07-24 RX ADMIN — LIDOCAINE HYDROCHLORIDE 2 ML: 10 INJECTION, SOLUTION INFILTRATION; PERINEURAL at 15:03

## 2019-07-24 RX ADMIN — METHYLPREDNISOLONE ACETATE 80 MG: 40 INJECTION, SUSPENSION INTRA-ARTICULAR; INTRALESIONAL; INTRAMUSCULAR; SOFT TISSUE at 15:03

## 2019-07-24 RX ADMIN — BUPIVACAINE HYDROCHLORIDE 5 ML: 2.5 INJECTION, SOLUTION EPIDURAL; INFILTRATION; INTRACAUDAL; PERINEURAL at 15:03

## 2019-07-24 RX ADMIN — IOPAMIDOL 0.05 ML: 612 INJECTION, SOLUTION INTRAVENOUS at 15:03

## 2019-07-25 ENCOUNTER — OFFICE VISIT (OUTPATIENT)
Dept: ORTHOPEDIC SURGERY | Facility: CLINIC | Age: 59
End: 2019-07-25

## 2019-07-25 VITALS — TEMPERATURE: 97.9 F | HEIGHT: 60 IN | WEIGHT: 140 LBS | BODY MASS INDEX: 27.48 KG/M2

## 2019-07-25 DIAGNOSIS — IMO0002 BURSITIS/TENDONITIS, SHOULDER: ICD-10-CM

## 2019-07-25 DIAGNOSIS — M25.512 LEFT SHOULDER PAIN, UNSPECIFIED CHRONICITY: Primary | ICD-10-CM

## 2019-07-25 PROCEDURE — 99214 OFFICE O/P EST MOD 30 MIN: CPT | Performed by: ORTHOPAEDIC SURGERY

## 2019-07-25 PROCEDURE — 73030 X-RAY EXAM OF SHOULDER: CPT | Performed by: ORTHOPAEDIC SURGERY

## 2019-07-25 PROCEDURE — 20610 DRAIN/INJ JOINT/BURSA W/O US: CPT | Performed by: ORTHOPAEDIC SURGERY

## 2019-07-25 RX ADMIN — METHYLPREDNISOLONE ACETATE 80 MG: 80 INJECTION, SUSPENSION INTRA-ARTICULAR; INTRALESIONAL; INTRAMUSCULAR; SOFT TISSUE at 15:16

## 2019-07-25 RX ADMIN — LIDOCAINE HYDROCHLORIDE 4 ML: 10 INJECTION, SOLUTION EPIDURAL; INFILTRATION; INTRACAUDAL; PERINEURAL at 15:16

## 2019-07-25 NOTE — PROGRESS NOTES
New Shoulder      Patient: WOODY Anthony        YOB: 1960    Medical Record Number: [id number]        Chief Complaints: Left shoulder pain      History of Present Illness: This is a 58-year-old female have seen in the past for right shoulder who has a large nonrepairable rotator cuff tear and is seeing Dr. Ray for that.  She presents complaining of left shoulder pain this been ongoing for several weeks now.  She does think she is overusing that are because of the problems with her right.  Her elderly father recently moved up here from [place] and she is having to care for him and doing a lot of lifting pushing and pulling.  Current symptoms are moderate intermittent aching worse with activity somewhat better with anti-inflammatories past medical history smart for osteoporosis and rheumatoid arthritis      Allergies:   Allergies   Allergen Reactions   • Adhesive Tape Itching   • Codeine Itching   • Flu Virus Vaccine Nausea Only and Other (See Comments)     Severe myalgias, fevery, FLIP       Medications:   Home Medications:  Current Outpatient Medications on File Prior to Visit   Medication Sig   • buPROPion SR (WELLBUTRIN SR) 150 MG 12 hr tablet Take 150 mg by mouth Daily.   • CALCIUM CARBONATE PO Take 1,000 mg by mouth Daily.   • cephalexin (KEFLEX) 500 MG capsule Take 4 caps one hour prior to dental procedure   • diclofenac (VOLTAREN) 1 % gel gel Apply 4 g topically to the appropriate area as directed 4 (Four) Times a Day As Needed (pain).   • fluticasone (FLONASE) 50 MCG/ACT nasal spray SHAKE LQ AND U 1 SPR IEN D   • meloxicam (MOBIC) 15 MG tablet 1 PO Daily with food.   • pantoprazole (PROTONIX) 40 MG EC tablet Take 40 mg by mouth every morning.   • pravastatin (PRAVACHOL) 10 MG tablet Take 10 mg by mouth Daily.     No current facility-administered medications on file prior to visit.      Current Medications:  Scheduled Meds:  Continuous Infusions:  No current facility-administered  "medications for this visit.   PRN Meds:.    Past Medical History:   Diagnosis Date   • Acid reflux    • Bundle branch block    • Depression    • High cholesterol    • History of migraine    • RHA (rheumatoid arthritis) (CMS/HCC)    • Seasonal affective disorder (CMS/HCC)    • Sleep apnea     mild   • Spinal headache     chavez monae fusion lumbar are  for delivery of child anesthesia attempted epidural above the fusion and resulted  in spinal headache   • Toxic condition due to an overly active thyroid gland         Past Surgical History:   Procedure Laterality Date   • BREAST ABSCESS INCISION AND DRAINAGE     • KNEE ARTHROPLASTY UNICOMPARTMENTAL Right 6/24/2016    Procedure: RT KNEE ARTHROPLASTY UNICOMPARTMENTAL;  Surgeon: Gamal Correa MD;  Location: [facility];  Service:    • KNEE ARTHROPLASTY UNICOMPARTMENTAL Left 8/25/2017    Procedure: KNEE ARTHROPLASTY UNICOMPARTMENTAL;  Surgeon: Gamal Correa MD;  Location: [facility];  Service:    • KNEE SURGERY Left     arthroscopic   • SPINE SURGERY      chavez monae fusion         Social History     Occupational History   • Not on file   Tobacco Use   • Smoking status: Never Smoker   • Smokeless tobacco: Never Used   Substance and Sexual Activity   • Alcohol use: No   • Drug use: No   • Sexual activity: Defer    Social History     Social History Narrative   • Not on file        Family History   Problem Relation Age of Onset   • Malig Hyperthermia Neg Hx              Review of Systems: 14 point review of systems are remarkable for shoulder pain only the remainder negative per the patient    Review of Systems      Physical Exam: 58 y.o. female  General Appearance:    Alert, cooperative, in no acute distress                 Vitals:    07/25/19 1445   Temp: 97.9 °F (36.6 °C)   TempSrc: Temporal   Weight: 63.5 kg (140 lb)   Height: 152.4 cm (60\")      Patient is alert and read ×3 no acute distress appears her above-listed at height weight and age.  Affect is " normal respiratory rate is normal unlabored. Heart rate regular rate rhythm, sclera, dentition and hearing are normal for the purpose of this exam.    Ortho Exam  Physical exam of the left shoulder reveals no overlying skin changes no lymphedema no lymphadenopathy.  Patient has active flexion 180 with mild symptoms abduction is similar external rotation is to 50 and internal rotation to the upper lumbar spine with mild symptoms.  Patient has good rotator cuff strength 4+ over 5 with isometric strength testing with pain.  Patient has a positive impingement and a positive Simmons sign.  Patient has good cervical range of motion which is full and asymptomatic no radicular symptoms.  Patient has a normal elbow exam.  Good distal pulses are presentPatient has pain with overhead activity and a positive Neer sign and a positive empty can sign  They have a positive drop arm any definitive painful arc    Large Joint Arthrocentesis: L subacromial bursa  Date/Time: 7/25/2019 3:16 PM  Consent given by: patient  Site marked: site marked  Timeout: Immediately prior to procedure a time out was called to verify the correct patient, procedure, equipment, support staff and site/side marked as required   Supporting Documentation  Indications: pain   Procedure Details  Location: shoulder - L subacromial bursa  Preparation: Patient was prepped and draped in the usual sterile fashion  Needle size: 22 G  Approach: posterior  Medications administered: 80 mg methylPREDNISolone acetate 80 MG/ML; 4 mL lidocaine PF 1% 1 %  Patient tolerance: patient tolerated the procedure well with no immediate complications                Radiology:   AP, Scapular Y and Axillary Lateral of the left I have no compared to films of the left shoulder she does have some acromioclavicular arthritis as well as some sclerosis at the greater tuberosity no acute pathology shoulder were ordered/reviewed to evauate shoulder pain.  Imaging Results (most recent)      Procedure Component Value Units Date/Time    XR Shoulder 2+ View Left [[id number]] Resulted:  07/25/19 1437     Updated:  07/25/19 1441    Impression:       Ordering physician's impression is located in the Encounter Note dated 07/25/19. X-ray performed in the DR room.          Assessment/Plan: Left shoulder pain I do think this is rotator cuff in origin she does have a good strength I suspect this is more of an impingement plan is to proceed with an injection as a diagnostic and therapeutic tool she fails to improve we will pursue other means of testing  Cortisone Injection. See procedure note.  Cortisone Injection for DIAGNOSTIC and THERAPUTIC purposes.

## 2019-07-26 RX ORDER — METHYLPREDNISOLONE ACETATE 80 MG/ML
80 INJECTION, SUSPENSION INTRA-ARTICULAR; INTRALESIONAL; INTRAMUSCULAR; SOFT TISSUE
Status: COMPLETED | OUTPATIENT
Start: 2019-07-25 | End: 2019-07-25

## 2019-07-26 RX ORDER — LIDOCAINE HYDROCHLORIDE 10 MG/ML
4 INJECTION, SOLUTION EPIDURAL; INFILTRATION; INTRACAUDAL; PERINEURAL
Status: COMPLETED | OUTPATIENT
Start: 2019-07-25 | End: 2019-07-25

## 2019-07-29 ENCOUNTER — OFFICE VISIT (OUTPATIENT)
Dept: ORTHOPEDIC SURGERY | Facility: CLINIC | Age: 59
End: 2019-07-29

## 2019-07-29 VITALS — TEMPERATURE: 97.6 F | WEIGHT: 140 LBS | BODY MASS INDEX: 27.48 KG/M2 | HEIGHT: 60 IN

## 2019-07-29 DIAGNOSIS — Z09 FOLLOW UP: Primary | ICD-10-CM

## 2019-07-29 DIAGNOSIS — M20.11 HALLUX VALGUS OF RIGHT FOOT: ICD-10-CM

## 2019-07-29 DIAGNOSIS — M19.079 ARTHRITIS OF FOOT: ICD-10-CM

## 2019-07-29 DIAGNOSIS — M77.41 METATARSALGIA OF BOTH FEET: ICD-10-CM

## 2019-07-29 DIAGNOSIS — M77.42 METATARSALGIA OF BOTH FEET: ICD-10-CM

## 2019-07-29 PROCEDURE — 73630 X-RAY EXAM OF FOOT: CPT | Performed by: ORTHOPAEDIC SURGERY

## 2019-07-29 PROCEDURE — 99213 OFFICE O/P EST LOW 20 MIN: CPT | Performed by: ORTHOPAEDIC SURGERY

## 2019-07-29 NOTE — PROGRESS NOTES
"Foot Follow Up      Patient: WOODY Anthony    YOB: 1960 58 y.o. female    Chief Complaints: Foot feels good    History of Present Illness:Patient has been followed extensively for bilateral midfoot arthritis as well as hallux valgus deformities with varus alignment of the MTP joints and arthritic change at the second MTP joint more so than on the right.    She was last seen on 4/25/2019 and has been using arch supports.  She called on 7/18/2019 had recurrence of pain in the midfoot.  She had radiographic guided injection again with 90% relief in her pain.  She is very pleased with this at this time and has no complaints of significant pain in the forefoot and only mild occasional aching pain in the midfoot.  HPI    ROS: Foot pain  Past Medical History:   Diagnosis Date   • Acid reflux    • Bundle branch block    • Depression    • High cholesterol    • History of migraine    • RHA (rheumatoid arthritis) (CMS/[facility])    • Seasonal affective disorder (CMS/[facility])    • Sleep apnea     mild   • Spinal headache     chavez monae fusion lumbar are  for delivery of child anesthesia attempted epidural above the fusion and resulted  in spinal headache   • Toxic condition due to an overly active thyroid gland      Physical Exam:   Vitals:    07/29/19 1554   Temp: 97.6 °F (36.4 °C)   TempSrc: Temporal   Weight: 63.5 kg (140 lb)   Height: 152.4 cm (60\")   PainSc:   2   PainLoc: Foot     Well developed with normal mood.  On exam she has significant valgus alignment to her toes but no focal tenderness to palpation.  There is minimal pain discomfort of the dorsum of the midfoot and no focal swelling warmth or erythema      Radiology: 3 views of the right foot ordered evaluate pain reviewed and compared with previous x-rays.  From April.  There is still severe degenerative change to the midfoot at the second and third TMT joints more so than the fourth.  There is valgus alignment to the lesser toes with arthritic " change of the second metatarsal head as well as hallux valgus deformity.      Assessment/Plan:   1.  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  Chronic bilateral hallux valgus with lesser toe valgus deformity right greater than left    We discussed treatment options going forward and she is getting good relief with injections about every 3 months.    She does not want to do anything from a surgical standpoint at this time but understands that she may very well eventually require at least midfoot fusion but the forefoot is not bothering her so we will leave that alone as this would be quite extensive.    She may end up having rotator cuff surgery and does not want to do anything with her foot at this time.    She will continue with heel cord stretching exercises use of orthotics and accommodative shoes and if symptoms persist or worsen she will let me know we could consider repeat injections    I will see her back as needed

## 2019-08-07 ENCOUNTER — OFFICE VISIT (OUTPATIENT)
Dept: ORTHOPEDIC SURGERY | Facility: CLINIC | Age: 59
End: 2019-08-07

## 2019-08-07 VITALS — HEIGHT: 60 IN | TEMPERATURE: 97.8 F | BODY MASS INDEX: 27.48 KG/M2 | WEIGHT: 140 LBS

## 2019-08-07 DIAGNOSIS — M75.100 TEAR OF ROTATOR CUFF, UNSPECIFIED LATERALITY, UNSPECIFIED TEAR EXTENT: Primary | ICD-10-CM

## 2019-08-07 PROCEDURE — 99214 OFFICE O/P EST MOD 30 MIN: CPT | Performed by: ORTHOPAEDIC SURGERY

## 2019-08-08 NOTE — PROGRESS NOTES
Patient: WOODY Anthony    YOB: 1960    Medical Record Number: [id number]    Chief Complaints:  Referral for right shoulder pain    History of Present Illness:     58 y.o. female patient who presents for evaluation of the right shoulder.  The patient is referred to me for further evaluation by Dr. Persaud.  The patient has a long history of worsening right shoulder pain and dysfunction.  Pain is described as moderate to severe, constant and aching.  She reports difficulty with overhead activities, reaching and lifting.  She has tried and failed conservative treatment.  She reports having undergone multiple previous injections over the past several years.  The injections have provided transient relief.  She completed an extensive therapy program which did not help significantly.  Of note, she was helping her father move about 2 months ago and her shoulder popped.  She reports increased pain since that time.      Allergies:   Allergies   Allergen Reactions   • Adhesive Tape Itching   • Codeine Itching   • Flu Virus Vaccine Nausea Only and Other (See Comments)     Severe myalgias, fevery, FLIP       Home Medications:    Current Outpatient Medications:   •  buPROPion SR (WELLBUTRIN SR) 150 MG 12 hr tablet, Take 150 mg by mouth Daily., Disp: , Rfl:   •  CALCIUM CARBONATE PO, Take 1,000 mg by mouth Daily., Disp: , Rfl:   •  cephalexin (KEFLEX) 500 MG capsule, Take 4 caps one hour prior to dental procedure, Disp: 12 capsule, Rfl: 0  •  diclofenac (VOLTAREN) 1 % gel gel, Apply 4 g topically to the appropriate area as directed 4 (Four) Times a Day As Needed (pain)., Disp: 100 g, Rfl: 2  •  fluticasone (FLONASE) 50 MCG/ACT nasal spray, SHAKE LQ AND U 1 SPR IEN D, Disp: , Rfl: 1  •  meloxicam (MOBIC) 15 MG tablet, 1 PO Daily with food., Disp: 30 tablet, Rfl: 3  •  pantoprazole (PROTONIX) 40 MG EC tablet, Take 40 mg by mouth every morning., Disp: , Rfl: 1  •  pravastatin (PRAVACHOL) 10 MG tablet, Take 10 mg  by mouth Daily., Disp: , Rfl:     Past Medical History:   Diagnosis Date   • Acid reflux    • Bundle branch block    • Depression    • High cholesterol    • History of migraine    • RHA (rheumatoid arthritis) (CMS/HCC)    • Seasonal affective disorder (CMS/HCC)    • Sleep apnea     mild   • Spinal headache     chavez monae fusion lumbar are  for delivery of child anesthesia attempted epidural above the fusion and resulted  in spinal headache   • Toxic condition due to an overly active thyroid gland        Past Surgical History:   Procedure Laterality Date   • BREAST ABSCESS INCISION AND DRAINAGE     • KNEE ARTHROPLASTY UNICOMPARTMENTAL Right 6/24/2016    Procedure: RT KNEE ARTHROPLASTY UNICOMPARTMENTAL;  Surgeon: Gamal Correa MD;  Location: [facility];  Service:    • KNEE ARTHROPLASTY UNICOMPARTMENTAL Left 8/25/2017    Procedure: KNEE ARTHROPLASTY UNICOMPARTMENTAL;  Surgeon: Gamal Correa MD;  Location: [facility];  Service:    • KNEE SURGERY Left     arthroscopic   • SPINE SURGERY      chavez monae fusion        Social History     Occupational History   • Not on file   Tobacco Use   • Smoking status: Never Smoker   • Smokeless tobacco: Never Used   Substance and Sexual Activity   • Alcohol use: No   • Drug use: No   • Sexual activity: Defer      Social History     Social History Narrative   • Not on file       Family History   Problem Relation Age of Onset   • Malig Hyperthermia Neg Hx        Review of Systems:      Constitutional: Denies fever, shaking or chills   Eyes: Denies change in visual acuity   HEENT: Denies nasal congestion or sore throat   Respiratory: Denies cough or shortness of breath   Cardiovascular: Denies chest pain or edema  Endocrine: Denies tremors, palpitations, intolerance of heat or cold, polyuria, polydipsia.  GI: Denies abdominal pain, nausea, vomiting, bloody stools or diarrhea  : Denies frequency, urgency, incontinence, retention, or nocturia.  Musculoskeletal: Denies  "numbness, tingling or loss of motor function except as above  Integument: Denies rash, lesion or ulceration   Neurologic: Denies headache or focal weakness, deficits  Heme: Denies spontaneous or excessive bleeding, epistaxis, hematuria, melena, fatigue, enlarged or tender lymph nodes.      All other pertinent positives and negatives as noted above in HPI.    Physical Exam: 58 y.o. female    Vitals:    08/07/19 1354   Temp: 97.8 °F (36.6 °C)   Weight: 63.5 kg (140 lb)   Height: 152.4 cm (60\")     General:  Patient is awake and alert.  Appears in no acute distress or discomfort.    Psych:  Affect and demeanor are appropriate.    Eyes:  Conjunctiva and sclera appear grossly normal.  Eyes track well and EOM seem to be intact.    Ears:  No gross abnormalities.  Hearing adequate for the exam.    Cardiovascular:  Regular rate and rhythm.    Lungs:  Good chest expansion.  Breathing unlabored.    Extremities: Right shoulder is examined. Skin is benign.  No obvious gross abnormalities.  No palpable masses or adenopathy.  Moderate tenderness noted over anterior glenohumeral joint and rotator interval.  Motion is full.  She does have discomfort with mid arc elevation.  She has roughly a 5 to 10 degree external rotation lag sign.  Negative Hornblower's.  No instability.  4 out of 5 strength with elevation in the scapular plane and external rotation.  Positive belly press maneuver.  Good motor and sensory function in lower arm and hand.  Brisk capillary refill in hand.  Palpable radial pulse.  Good skin turgor.    Imaging:  Previous x-rays including AP, scapular Y and axillary views of the right shoulder are reviewed.  The x-rays show no significant abnormalities.  Her MRI from July 17 is reviewed along with the associated report.  She has what appears to be a massive, retracted rotator cuff tear involving the subscapularis, supraspinatus and anterior infraspinatus.  There is grade 3/4 atrophy.  Mild glenohumeral arthritis.  " There is superior migration of the humeral head.  Incidental note of degenerative labral tear and chronic long head of biceps tendon rupture.    Assessment/Plan:  Right shoulder massive, irreparable rotator cuff tear    We had a long discussion about options.  There are really 3 options for her.  Continued conservative care is very reasonable.  That would include injections, therapy and activity modifications.  She has already tried this somewhat but continued conservative care is reasonable if she can manage.  Second option is a reverse arthroplasty.  That is a predictable option but she is relatively young and I would hope to put that off, if possible.  I explained that there is now a third option of a capsular reconstruction.  I explained that this is a relatively new procedure but that the early results are promising.  I told her that I have seen good results with this procedure in my practice thus far and I think this is a reasonable option for her.  Unfortunately, there are no guarantees with this and it may not work out.  If that surgery fails then she is probably looking at the arthroplasty.  We discussed in detail.  She has a number of questions.  I answered those in detail.  We thoroughly discussed the risk, benefits and alternatives.  I encouraged her to go home and think about it.  She will let me know how she wants to proceed.  For now, she is going to follow-up as needed.    Lanre Ray MD    08/07/2019

## 2019-08-11 NOTE — TELEPHONE ENCOUNTER
Please let her know that I have sent enough pills for 3 dental visits.  Take only 4 an hour before each dental visit.    General

## 2019-08-12 ENCOUNTER — PREP FOR SURGERY (OUTPATIENT)
Dept: OTHER | Facility: HOSPITAL | Age: 59
End: 2019-08-12

## 2019-08-12 ENCOUNTER — TELEPHONE (OUTPATIENT)
Dept: ORTHOPEDIC SURGERY | Facility: CLINIC | Age: 59
End: 2019-08-12

## 2019-08-12 DIAGNOSIS — M75.121 COMPLETE TEAR OF RIGHT ROTATOR CUFF: Primary | ICD-10-CM

## 2019-08-12 RX ORDER — CEFAZOLIN SODIUM 2 G/100ML
2 INJECTION, SOLUTION INTRAVENOUS ONCE
Status: CANCELLED | OUTPATIENT
Start: 2019-10-15 | End: 2019-08-12

## 2019-08-12 NOTE — TELEPHONE ENCOUNTER
I left a voice mail message for patient to let her know that I will get the surgery process started and she should be hearing from our  in the near future.

## 2019-08-12 NOTE — TELEPHONE ENCOUNTER
Regarding: Visit Follow-Up Question  Contact: [phone]  ----- Message from Mychart, Generic sent at 8/10/2019 12:22 AM EDT -----    Delmar Agarwal.  I would like to do the patch graft surgery on my right arm. What do I need to do to get it scheduled.  I’m not in a rush  but definitely want to do it !  Thanks for all   Tanna

## 2019-08-26 ENCOUNTER — TELEPHONE (OUTPATIENT)
Dept: ORTHOPEDIC SURGERY | Facility: CLINIC | Age: 59
End: 2019-08-26

## 2019-08-26 NOTE — TELEPHONE ENCOUNTER
That is fine.  Check my October schedule with Sara Oliveira.  Just make sure that I am working at that week.  I believe I am.

## 2019-08-26 NOTE — TELEPHONE ENCOUNTER
Regarding: Non-Urgent Medical Question  Contact: [phone]  ----- Message from Mychart, Generic sent at 8/25/2019  3:31 AM EDT -----    Hi there. I got the message about the dates available for the patch graft shoulder surgery. I think I would like to go ahead and schedule for Oct 15th if I can .    Thanks

## 2019-08-30 PROBLEM — M75.121 COMPLETE TEAR OF RIGHT ROTATOR CUFF: Status: ACTIVE | Noted: 2019-08-30

## 2019-09-04 ENCOUNTER — TELEPHONE (OUTPATIENT)
Dept: ORTHOPEDIC SURGERY | Facility: CLINIC | Age: 59
End: 2019-09-04

## 2019-09-04 NOTE — TELEPHONE ENCOUNTER
MY CHART MESSAGE:    Hi there.  I am scheduled to have patch graft surgery on my right shoulder Oct 15.  Can I possibly get my foot injected a few weeks before that to help me get through the rehab process with the shoulder. I honestly wish I were having my foot repaired instead of my shoulder. I want to do that as soon as I possibly can once  my shoulder is usable again.   My left foot is starting to get pretty painful in the same spots. Should I have it injected also.  Let me know what you think and thank you so much in advance   Tanna Anthony

## 2019-09-04 NOTE — TELEPHONE ENCOUNTER
Regarding: Non-Urgent Medical Question  Contact: [phone]  ----- Message from [employer], Generic sent at 9/4/2019 12:55 PM EDT -----  MY CHART MESSAGE;    Delmar Persaud. My knees have been somewhat sore over the summer. I was just wanting to get them checked and make sure my partial replacements are ok. I took a pretty bad fall right on both knee caps the beginning of the summer. Just want to make sure all is ok. If you can see me or If you want to send this ok to dr montalvo and Sara that’s fine too  Thank you so much   Tanna

## 2019-09-04 NOTE — TELEPHONE ENCOUNTER
Have left a message for the patient to contact me at the office regarding her rheumatology appointment   115.7

## 2019-09-09 ENCOUNTER — TELEPHONE (OUTPATIENT)
Dept: ORTHOPEDIC SURGERY | Facility: CLINIC | Age: 59
End: 2019-09-09

## 2019-09-09 NOTE — TELEPHONE ENCOUNTER
Regarding: Non-Urgent Medical Question  Contact: [phone]  ----- Message from Wami, Generic sent at 9/8/2019 11:41 PM EDT -----    Dr Ray. I slid and landed on my left shoulder. I’m pretty sure  I tore rotator cuff all the way through also   More painful than the one I had surgery on.  Went to er to check that nothing was broken   Have surgery for right one   Patch graft surgery oct 15. What do I do now ?  Need mri on left   Let me know

## 2019-09-11 ENCOUNTER — OFFICE VISIT (OUTPATIENT)
Dept: ORTHOPEDIC SURGERY | Facility: CLINIC | Age: 59
End: 2019-09-11

## 2019-09-11 VITALS — HEIGHT: 60 IN | WEIGHT: 140 LBS | BODY MASS INDEX: 27.48 KG/M2 | TEMPERATURE: 98.3 F

## 2019-09-11 DIAGNOSIS — S49.92XA INJURY OF LEFT SHOULDER, INITIAL ENCOUNTER: Primary | ICD-10-CM

## 2019-09-11 PROCEDURE — 99213 OFFICE O/P EST LOW 20 MIN: CPT | Performed by: NURSE PRACTITIONER

## 2019-09-11 RX ORDER — FEXOFENADINE HYDROCHLORIDE AND PSEUDOEPHEDRINE HYDROCHLORIDE 180; 240 MG/1; MG/1
1 TABLET, FILM COATED, EXTENDED RELEASE ORAL DAILY PRN
Refills: 3 | COMMUNITY
Start: 2019-08-19

## 2019-09-11 RX ORDER — NAPROXEN 500 MG/1
TABLET ORAL
Refills: 3 | COMMUNITY
Start: 2019-08-12 | End: 2019-10-11

## 2019-09-11 NOTE — PROGRESS NOTES
Patient: Tanna Anthony    YOB: 1960    Medical Record Number: [id number]    Chief Complaints: Left shoulder injury    History of Present Illness:     58 y.o. female patient who presents for evaluation of left shoulder injury.  Injury occurred on September 6.  Reports she was dog sitting for a friend when she slid in some water.  She fell landing awkwardly with her left shoulder hitting the floor with her left arm under her body.  She was evaluated at [facility] and x-rays were performed.  Reports no acute abnormalities.  Describes her pain as severe, constant, and aching with burning at times.  She tells me her left shoulder pain is so severe that she is concerned and apprehensive about her upcoming right shoulder surgery.  She feels it may be better to postpone her right shoulder surgery and pursue further evaluation of the left shoulder with MRI.  Reports her pain is worse at night and often radiates up to her neck.  She tells me she has extreme weakness since the fall.  She takes naproxen twice daily for arthralgias, but this does not seem to help her shoulder pain.  Reports an occasional popping sensation.  Patient is right-hand dominant.  Of note, she has a history of left shoulder pain many years ago.     Allergies:   Allergies   Allergen Reactions   • Adhesive Tape Itching   • Codeine Itching   • Flu Virus Vaccine Nausea Only and Other (See Comments)     Severe myalgias, fevery, FLIP     Home Medications:    Current Outpatient Medications:   •  ALLEGRA-D ALLERGY & CONGESTION 180-240 MG per 24 hr tablet, Take 1 tablet by mouth Daily., Disp: , Rfl: 3  •  buPROPion SR (WELLBUTRIN SR) 150 MG 12 hr tablet, Take 150 mg by mouth Daily., Disp: , Rfl:   •  naproxen (NAPROSYN) 500 MG tablet, TK 1 T PO BID PRF PAIN, Disp: , Rfl: 3  •  CALCIUM CARBONATE PO, Take 1,000 mg by mouth Daily., Disp: , Rfl:   •  cephalexin (KEFLEX) 500 MG capsule, Take 4 caps one hour prior to dental procedure,  Disp: 12 capsule, Rfl: 0  •  diclofenac (VOLTAREN) 1 % gel gel, Apply 4 g topically to the appropriate area as directed 4 (Four) Times a Day As Needed (pain)., Disp: 100 g, Rfl: 2  •  fluticasone (FLONASE) 50 MCG/ACT nasal spray, SHAKE LQ AND U 1 SPR IEN D, Disp: , Rfl: 1  •  meloxicam (MOBIC) 15 MG tablet, 1 PO Daily with food., Disp: 30 tablet, Rfl: 3  •  pantoprazole (PROTONIX) 40 MG EC tablet, Take 40 mg by mouth every morning., Disp: , Rfl: 1  •  pravastatin (PRAVACHOL) 10 MG tablet, Take 10 mg by mouth Daily., Disp: , Rfl:     Past Medical History:   Diagnosis Date   • Acid reflux    • Bundle branch block    • Depression    • High cholesterol    • History of migraine    • RHA (rheumatoid arthritis) (CMS/HCC)    • Seasonal affective disorder (CMS/HCC)    • Sleep apnea     mild   • Spinal headache     chavez monae fusion lumbar are  for delivery of child anesthesia attempted epidural above the fusion and resulted  in spinal headache   • Toxic condition due to an overly active thyroid gland        Past Surgical History:   Procedure Laterality Date   • BREAST ABSCESS INCISION AND DRAINAGE     • KNEE ARTHROPLASTY UNICOMPARTMENTAL Right 6/24/2016    Procedure: RT KNEE ARTHROPLASTY UNICOMPARTMENTAL;  Surgeon: Gamal Correa MD;  Location: [facility];  Service:    • KNEE ARTHROPLASTY UNICOMPARTMENTAL Left 8/25/2017    Procedure: KNEE ARTHROPLASTY UNICOMPARTMENTAL;  Surgeon: Gamal Correa MD;  Location: [facility];  Service:    • KNEE SURGERY Left     arthroscopic   • SPINE SURGERY      chavez monae fusion        Social History     Occupational History   • Not on file   Tobacco Use   • Smoking status: Never Smoker   • Smokeless tobacco: Never Used   Substance and Sexual Activity   • Alcohol use: No   • Drug use: No   • Sexual activity: Defer      Social History     Social History Narrative   • Not on file       Family History   Problem Relation Age of Onset   • Malig Hyperthermia Neg Hx        Review of  "Systems:      Constitutional: Denies fever, shaking or chills   Eyes: Denies change in visual acuity   HEENT: Denies nasal congestion or sore throat   Respiratory: Denies cough or shortness of breath   Cardiovascular: Denies chest pain or edema  Endocrine: Denies tremors, palpitations, intolerance of heat or cold, polyuria, polydipsia.  GI: Denies abdominal pain, nausea, vomiting, bloody stools or diarrhea  : Denies frequency, urgency, incontinence, retention, or nocturia.  Musculoskeletal: Denies numbness, tingling or loss of motor function except as above  Integument: Denies rash, lesion or ulceration   Neurologic: Denies headache or focal weakness, deficits  Heme: Denies spontaneous or excessive bleeding, epistaxis, hematuria, melena, fatigue, enlarged or tender lymph nodes.      All other pertinent positives and negatives as noted above in HPI.    Physical Exam: 58 y.o. female    Vitals:    09/11/19 1326   Temp: 98.3 °F (36.8 °C)   Weight: 63.5 kg (140 lb)   Height: 152.4 cm (60\")       General:  Patient is awake and alert.  Appears in no acute distress or discomfort.    Psych:  Affect and demeanor are appropriate.    Eyes:  Conjunctiva and sclera appear grossly normal.  Eyes track well and EOM seem to be intact.    Ears:  No gross abnormalities.  Hearing adequate for the exam.    Cardiovascular:  Regular rate and rhythm.    Lungs:  Good chest expansion.  Breathing unlabored.    Lymph:  No palpable masses or adenopathy in the affected extremity    Left upper extremity:  Skin is benign.  No gross abnormalities on inspection including any atrophy, swellings, or masses.  No palpable masses or adenopathy.  Focal tenderness to the top of the shoulder and deltoid region.  Her motion is limited and uncomfortable.  Shoulder motion is 175° forward elevation, 65° external rotation, internal rotation to T10. No evident instability or apprehension.  Mildly positive Neer Simmons.  Negative active compression maneuvers.  " Negative Speeds and Yergasons maneuver.  Negative belly press maneuver.  Weakness with forward elevation in the scapular plane, internal rotation, and external rotation.  Good strength in the deltoid, biceps, triceps, and .  Intact sensation throughout the arm.  Brisk cap refill.  Palpable radial pulse.  Good skin turgor.         Radiology:   Outside x-rays are reviewed which show mild acromioclavicular joint arthritis.  Otherwise, no acute abnormalities noted.    Assessment/Plan:  Left shoulder injury, suspected rotator cuff tear    Dr. Ray came into the examination room with me to discuss treatment options with the patient.  The patient is scheduled for a right shoulder arthroscopic capsular reconstruction surgery on October 15.  We all agreed the left shoulder needs further evaluation by MRI prior to moving forward with her upcoming surgery.  If the MRI reveals a repairable tear on the left, it is reasonable to consider doing a repair on the left first.  Dr. Ray talked with her regarding the natural progression of rotator cuff tears and conservative versus surgical interventions.  Patient acknowledged understanding.  I or Dr. Ray will call her with the MRI results.       GWENDOLYN Zuniga    09/11/2019    CC to Yuni Mckeon MD

## 2019-09-12 ENCOUNTER — TELEPHONE (OUTPATIENT)
Dept: ORTHOPEDIC SURGERY | Facility: CLINIC | Age: 59
End: 2019-09-12

## 2019-09-12 RX ORDER — TRAMADOL HYDROCHLORIDE 50 MG/1
50 TABLET ORAL EVERY 4 HOURS PRN
Qty: 60 TABLET | Refills: 0 | Status: SHIPPED | OUTPATIENT
Start: 2019-09-12

## 2019-09-12 NOTE — TELEPHONE ENCOUNTER
I called in a Rx for Ultram for her.  Please let her know.  Tell her that she can take this with the Naprosyn.  Thanks.

## 2019-09-12 NOTE — TELEPHONE ENCOUNTER
Regarding: Non-Urgent Medical Question  Contact: [phone]  ----- Message from Mychart, Generic sent at 9/12/2019  8:03 AM EDT -----  MY CHART MESSAGE:    Hi  there   When I was in yesterday Kari mentioned I could possibly take transformers.  Is therre anyway I could  that possibly.  I can barely move this morning both arms and the center of my back are hurting terribly. I scheduled 2nd MRi for the 23rd. Please let me know. The naprasin I take isn’t doing much at all     Thanks in advance   Tanna Anthony

## 2019-09-16 ENCOUNTER — DOCUMENTATION (OUTPATIENT)
Dept: ORTHOPEDIC SURGERY | Facility: CLINIC | Age: 59
End: 2019-09-16

## 2019-09-16 NOTE — PROGRESS NOTES
Patient left me a my chart message about 12 days ago and I have left her message at least once if not twice a day thereafter during business days requesting her to call and giving her my times availability and have no other contact number to reach her.  I left messages again today for her to call and be happy to discuss this with her.

## 2019-09-16 NOTE — TELEPHONE ENCOUNTER
I left patient messages at least once if not twice a day since her initial message was sent to me 12 days ago about injections in her feet and I have asked her to call me and given her times and I am available in the office and have not yet heard from her.  Letter a message today and will be happy to speak with her when she calls back thank you

## 2019-09-17 ENCOUNTER — TELEPHONE (OUTPATIENT)
Dept: ORTHOPEDIC SURGERY | Facility: CLINIC | Age: 59
End: 2019-09-17

## 2019-09-17 NOTE — TELEPHONE ENCOUNTER
Regarding: Non-Urgent Medical Question  Contact: [phone]  ----- Message from [employer], Generic sent at 9/17/2019  1:16 AM EDT -----  MY CHART MESSAGE:    Delmar Lott  I know you have been trying to reach me . I’ve had my hands full with taking care of my dad with these torn rotator cuffs .  I had called about possibly my foot getting injected prior to my shoulder surgery. I’m having patch graft surgery done on right shoulder as of now on Oct 15.  I’m not hurting terribly yet but I can tell it is wearing off .  Probably leaving messages here is the best way to reach me right now.   Thank you   Tanna LEVINE SHE USES [employer] FOR ALL HER MESSAGES.

## 2019-09-23 ENCOUNTER — HOSPITAL ENCOUNTER (OUTPATIENT)
Dept: MRI IMAGING | Facility: HOSPITAL | Age: 59
Discharge: HOME | End: 2019-09-23
Admitting: NURSE PRACTITIONER

## 2019-09-23 DIAGNOSIS — S49.92XA INJURY OF LEFT SHOULDER, INITIAL ENCOUNTER: ICD-10-CM

## 2019-09-23 PROCEDURE — 73221 MRI JOINT UPR EXTREM W/O DYE: CPT

## 2019-09-27 ENCOUNTER — DOCUMENTATION (OUTPATIENT)
Dept: ORTHOPEDIC SURGERY | Facility: CLINIC | Age: 59
End: 2019-09-27

## 2019-09-27 ENCOUNTER — TELEPHONE (OUTPATIENT)
Dept: ORTHOPEDIC SURGERY | Facility: CLINIC | Age: 59
End: 2019-09-27

## 2019-09-27 NOTE — TELEPHONE ENCOUNTER
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 He fell  recently so we have a whole new regimen of [facility] people in and out.  My foot is starting to hurt a lot again. I know I need surgery ..  I am trying to keep going and I have a recheck appointment with you for late October. I’m fully aware that I can’t do the injections  forever I am just trying to take care of everything I need to right now with some pain relief that’s not tons of nsaids .  If you don’t feel like you want to do the injections again yet I will wait and speak with you at my recheck in October .  sIabela Anthony

## 2019-09-27 NOTE — PROGRESS NOTES
I have left patient multiple message over the last several weeks the need to at least speak with her in person prior to any injections in her foot.  She has been unable to return my call as she is busy taking care of her father who has multiple medical problems and she is sleeping most of the time during the day which I understand but of also left messages for her that I do not feel comfortable proceeding with the injections without further assessment in person.    She is now only about 2 weeks out now from shoulder surgery so I think it is best to wait until after she has her shoulder surgery and I can reassess her in the office prior to any injections and it left her a message to that effect.

## 2019-09-30 ENCOUNTER — TELEPHONE (OUTPATIENT)
Dept: ORTHOPEDIC SURGERY | Facility: CLINIC | Age: 59
End: 2019-09-30

## 2019-09-30 NOTE — TELEPHONE ENCOUNTER
Regarding: Test Results Question  Contact: [phone]  ----- Message from [employer] Generic sent at 9/30/2019  2:10 AM EDT -----    Delmar Ray. I read my MRI results. So what is your suggestion as far as a plan goes with surgery.  Which one is the one to do first ?  Let me know. I know I’m hard to reach by phone surf my night work day sleep schedule . Feel free to advise here !  I really appreciate it .  Tanna

## 2019-09-30 NOTE — TELEPHONE ENCOUNTER
Please send her an email.  Let her know that the new MRI of the other shoulder does show a tear.  The tear does not appear to be nearly as bad as the tear in her other shoulder.  I recommend that she consider surgery for the tear but it is really up to her as far as which shoulder we work on first.  Generally, I recommend that we work on the most symptomatic shoulder.  Whichever her shoulder is hurting the most is the one that I would want to address first.  It is up to her though.  I would like to see her again face to face to discuss things before surgery.  We can leave her current surgery scheduled but we can switch it so that we work on the other side if that is her preference.  Let me know what she wants to do.  Thanks.

## 2019-09-30 NOTE — TELEPHONE ENCOUNTER
Regarding: Test Results Question  Contact: [phone]  ----- Message from Mychart, Generic sent at 9/28/2019  5:55 AM EDT -----    Delmar Ray   You can tell me the results of the MRI and what your suggestions would be and my options. I work opposite shifts from you do it will be difficult to connect by phone !!  Thank you !  Tanna

## 2019-10-02 ENCOUNTER — TELEPHONE (OUTPATIENT)
Dept: ORTHOPEDIC SURGERY | Facility: CLINIC | Age: 59
End: 2019-10-02

## 2019-10-02 NOTE — TELEPHONE ENCOUNTER
Left message requesting a return call.  Attempting to provide patient with left shoulder MRI results.

## 2019-10-04 ENCOUNTER — TELEPHONE (OUTPATIENT)
Dept: ORTHOPEDIC SURGERY | Facility: CLINIC | Age: 59
End: 2019-10-04

## 2019-10-04 ENCOUNTER — APPOINTMENT (OUTPATIENT)
Dept: PREADMISSION TESTING | Facility: HOSPITAL | Age: 59
End: 2019-10-04

## 2019-10-04 NOTE — TELEPHONE ENCOUNTER
Left answering machine message for patient to call me to schedule an appt with [facility] on 10/14 @ EP office./[place]

## 2019-10-04 NOTE — TELEPHONE ENCOUNTER
Regarding: RE: Test Results Question  Contact: [phone]  ----- Message from Mychart, Generic sent at 10/3/2019  7:22 AM EDT -----    Hi there. I need to schedule a presirfeey appointment with you guys. [place] Monday Tuesday wed afternoons are the best.     You can let me know what you gave this way.  We have been playing phone tag for a while   Thank you   Tanna Anthony  ----- Message -----  From: Raquel ELLIOTT  Sent: 9/30/2019 11:47 AM EDT  To: Tanna Anthony  Subject: RE: Test Results Question  Lanre Ray MD   to Jess Correia MA        9/30/19 10:12 AM   Note        Please send her an email.  Let her know that the new MRI of the other shoulder does show a tear.  The tear does not appear to be nearly as bad as the tear in her other shoulder.  I recommend that she consider surgery for the tear but it is really up to her as far as which shoulder we work on first.  Generally, I recommend that we work on the most symptomatic shoulder.  Whichever her shoulder is hurting the most is the one that I would want to address first.  It is up to her though.  I would like to see her again face to face to discuss things before surgery.  We can leave her current surgery scheduled but we can switch it so that we work on the other side if that is her preference.  Let me know what she wants to do.  Thanks.          ----- Message -----     From: Tanna Anthony     Sent: 9/30/2019  2:10 AM EDT       To: Lanre Ray MD  Subject: Test Results Question    Delmar Ray. I read my MRI results. So what is your suggestion as far as a plan goes with surgery.  Which one is the one to do first ?  Let me know. I know I’m hard to reach by phone surf my night work day sleep schedule . Feel free to advise here !  I really appreciate it .  Tanna

## 2019-10-04 NOTE — TELEPHONE ENCOUNTER
Patient returned call and was scheduled to see [facility] at EP office on 10/14. (checked on this with AK also)

## 2019-10-08 ENCOUNTER — TELEPHONE (OUTPATIENT)
Dept: ORTHOPEDIC SURGERY | Facility: CLINIC | Age: 59
End: 2019-10-08

## 2019-10-08 NOTE — TELEPHONE ENCOUNTER
Regarding: Non-Urgent Medical Question  Contact: [phone]  ----- Message from Mychart, Generic sent at 10/8/2019  7:28 AM EDT -----    Hi again. Is it at all an option for either of my shoulders to do protein rich plasma injections. If it is I would like to try it .  I’ve heard really good things about that too and wound cortisone in left help at all.  I’m just feeling like I can’t do the daily tasks I have to do right now with my “good” left shoulder . If I have bursitis is there anything  to help it ?   Lots of concerns honestly . I do not have much help at all at home and do take care of my 96 year old dad

## 2019-10-08 NOTE — TELEPHONE ENCOUNTER
Regarding: Non-Urgent Medical Question  Contact: [phone]  ----- Message from Mychart, Generic sent at 10/8/2019  6:33 AM EDT -----    Delmar Ray  I’m having a bunch of pain andwgat feels like catching on something in the left shoulder. I have a great deal of concern about trying to heal from right one with the left one as my good shoulder.   I’m taking the tramadol to try to handle the pain. Not really doing too much but for first hour if so.  I  have the pre surgery apt the day before surgery.  I just hope I am doing the right thing. If you think I’m rushing the survey. I can change it   If you want to wait and talk the day before surgery that’s fine I just wanted to give you a heads up in case date of surgery needs to change   I’m just concerned   Thank you for your time   Tanna miller

## 2019-10-10 ENCOUNTER — APPOINTMENT (OUTPATIENT)
Dept: PREADMISSION TESTING | Facility: HOSPITAL | Age: 59
End: 2019-10-10

## 2019-10-11 ENCOUNTER — APPOINTMENT (OUTPATIENT)
Dept: PREADMISSION TESTING | Facility: HOSPITAL | Age: 59
End: 2019-10-11

## 2019-10-11 VITALS
DIASTOLIC BLOOD PRESSURE: 84 MMHG | HEART RATE: 89 BPM | OXYGEN SATURATION: 94 % | TEMPERATURE: 98 F | SYSTOLIC BLOOD PRESSURE: 142 MMHG | RESPIRATION RATE: 16 BRPM | WEIGHT: 143.3 LBS | BODY MASS INDEX: 28.13 KG/M2 | HEIGHT: 60 IN

## 2019-10-11 LAB
ANION GAP SERPL CALCULATED.3IONS-SCNC: 11.8 MMOL/L (ref 5–15)
BUN BLD-MCNC: 11 MG/DL (ref 6–20)
BUN/CREAT SERPL: 20.4 (ref 7–25)
CALCIUM SPEC-SCNC: 8.6 MG/DL (ref 8.6–10.5)
CHLORIDE SERPL-SCNC: 100 MMOL/L (ref 98–107)
CO2 SERPL-SCNC: 25.2 MMOL/L (ref 22–29)
CREAT BLD-MCNC: 0.54 MG/DL (ref 0.57–1)
DEPRECATED RDW RBC AUTO: 40.7 FL (ref 37–54)
ERYTHROCYTE [DISTWIDTH] IN BLOOD BY AUTOMATED COUNT: 13 % (ref 12.3–15.4)
GFR SERPL CREATININE-BSD FRML MDRD: 116 ML/MIN/1.73
GLUCOSE BLD-MCNC: 77 MG/DL (ref 65–99)
HCT VFR BLD AUTO: 37.7 % (ref 34–46.6)
HGB BLD-MCNC: 12.9 G/DL (ref 12–15.9)
MCH RBC QN AUTO: 29.5 PG (ref 26.6–33)
MCHC RBC AUTO-ENTMCNC: 34.2 G/DL (ref 31.5–35.7)
MCV RBC AUTO: 86.3 FL (ref 79–97)
PLATELET # BLD AUTO: 374 10*3/MM3 (ref 140–450)
PMV BLD AUTO: 9 FL (ref 6–12)
POTASSIUM BLD-SCNC: 4 MMOL/L (ref 3.5–5.2)
RBC # BLD AUTO: 4.37 10*6/MM3 (ref 3.77–5.28)
SODIUM BLD-SCNC: 137 MMOL/L (ref 136–145)
WBC NRBC COR # BLD: 6 10*3/MM3 (ref 3.4–10.8)

## 2019-10-11 PROCEDURE — 85027 COMPLETE CBC AUTOMATED: CPT | Performed by: ORTHOPAEDIC SURGERY

## 2019-10-11 PROCEDURE — 36415 COLL VENOUS BLD VENIPUNCTURE: CPT

## 2019-10-11 PROCEDURE — 93010 ELECTROCARDIOGRAM REPORT: CPT | Performed by: INTERNAL MEDICINE

## 2019-10-11 PROCEDURE — 93005 ELECTROCARDIOGRAM TRACING: CPT

## 2019-10-11 PROCEDURE — 80048 BASIC METABOLIC PNL TOTAL CA: CPT | Performed by: ORTHOPAEDIC SURGERY

## 2019-10-11 RX ORDER — NAPROXEN 500 MG/1
500 TABLET ORAL 2 TIMES DAILY WITH MEALS
COMMUNITY
End: 2021-01-19

## 2019-10-11 RX ORDER — FLUTICASONE PROPIONATE 50 MCG
2 SPRAY, SUSPENSION (ML) NASAL DAILY
COMMUNITY

## 2019-10-11 NOTE — DISCHARGE INSTRUCTIONS
Take the following medications the morning of surgery with a small sip of water:  PANTOPRAZOLE, TRAMADOL (IF NEEDED)    THE HOSPITAL WILL CALL YOU WITH AN ARRIVAL TIME ON October 14 FOR SURGERY ON October 15, 2019  General Instructions:  • Do not eat solid food after midnight the night before surgery.  • You may drink clear liquids day of surgery but must stop at least one hour before your hospital arrival time.  • It is beneficial for you to have a clear drink that contains carbohydrates the day of surgery.  We suggest a 12 to 20 ounce bottle of Gatorade or Powerade for non-diabetic patients or a 12 to 20 ounce bottle of G2 or Powerade Zero for diabetic patients. (Pediatric patients, are not advised to drink a 12 to 20 ounce carbohydrate drink)    Clear liquids are liquids you can see through.  Nothing red in color.     Plain water                               Sports drinks  Sodas                                   Gelatin (Jell-O)  Fruit juices without pulp such as white grape juice and apple juice  Popsicles that contain no fruit or yogurt  Tea or coffee (no cream or milk added)  Gatorade / Powerade  G2 / Powerade Zero    • Infants may have breast milk up to four hours before surgery.  • Infants drinking formula may drink formula up to six hours before surgery.   • Patients who avoid smoking, chewing tobacco and alcohol for 4 weeks prior to surgery have a reduced risk of post-operative complications.  Quit smoking as many days before surgery as you can.  • Do not smoke, use chewing tobacco or drink alcohol the day of surgery.   • If applicable bring your C-PAP/ BI-PAP machine.  • Bring any papers given to you in the doctor’s office.  • Wear clean comfortable clothes.  • Do not wear contact lenses, false eyelashes or make-up.  Bring a case for your glasses.   • Bring crutches or walker if applicable.  • Remove all piercings.  Leave jewelry and any other valuables at home.  • Hair extensions with metal clips must  be removed prior to surgery.  • The Pre-Admission Testing nurse will instruct you to bring medications if unable to obtain an accurate list in Pre-Admission Testing.      Preventing a Surgical Site Infection:  • For 2 to 3 days before surgery, avoid shaving with a razor because the razor can irritate skin and make it easier to develop an infection.    • Any areas of open skin can increase the risk of a post-operative wound infection by allowing bacteria to enter and travel throughout the body.  Notify your surgeon if you have any skin wounds / rashes even if it is not near the expected surgical site.  The area will need assessed to determine if surgery should be delayed until it is healed.  • The night prior to surgery sleep in a clean bed with clean clothing.  Do not allow pets to sleep with you.  • Shower on the morning of surgery using a fresh bar of anti-bacterial soap (such as Dial) and clean washcloth.  Dry with a clean towel and dress in clean clothing.  • Ask your surgeon if you will be receiving antibiotics prior to surgery.  • Make sure you, your family, and all healthcare providers clean their hands with soap and water or an alcohol based hand  before caring for you or your wound.    Day of surgery:  Your arrival time is approximately two hours before your scheduled surgery time.  Upon arrival, a Pre-op nurse and Anesthesiologist will review your health history, obtain vital signs, and answer questions you may have.  The only belongings needed at this time will be a list of your home medications and if applicable your C-PAP/BI-PAP machine.  If you are staying overnight your family can leave the rest of your belongings in the car and bring them to your room later.  A Pre-op nurse will start an IV and you may receive medication in preparation for surgery, including something to help you relax.  Your family will be able to see you in the Pre-op area.  Two visitors at a time will be allowed in the  Pre-op room.  While you are in surgery your family should notify the waiting room  if they leave the waiting room area and provide a contact phone number.    Please be aware that surgery does come with discomfort.  We want to make every effort to control your discomfort so please discuss any uncontrolled symptoms with your nurse.   Your doctor will most likely have prescribed pain medications.      If you are going home after surgery you will receive individualized written care instructions before being discharged.  A responsible adult must drive you to and from the hospital on the day of your surgery and stay with you for 24 hours.    If you are staying overnight following surgery, you will be transported to your hospital room following the recovery period.  [facility] has all private rooms.    You have received a list of surgical assistants for your reference.  If you have any questions please call Pre-Admission Testing at [phone].  Deductibles and co-payments are collected on the day of service. Please be prepared to pay the required co-pay, deductible or deposit on the day of service as defined by your plan.

## 2019-10-15 ENCOUNTER — TELEPHONE (OUTPATIENT)
Dept: ORTHOPEDIC SURGERY | Facility: CLINIC | Age: 59
End: 2019-10-15

## 2019-10-15 NOTE — TELEPHONE ENCOUNTER
I called patient with no answer.  Left a message that I felt it was best that she be seen and reexamined and re-x-rayed in the office prior to doing any further injections.  She was encouraged to call if she has any problems or questions prior to that

## 2019-10-15 NOTE — TELEPHONE ENCOUNTER
Regarding: Non-Urgent Medical Question  Contact: [phone]  ----- Message from Mychart, Generic sent at 10/15/2019  1:55 AM EDT -----  MY CHART MESSAGE:  Hi again   I had to cancel my shoulder surgery.  My 96 year old father took a fall and I can’t possibly rehab a right shoulder while caring for him so I had to postpone.  Can I possibly get scheduled for the injections in my foot now ?  I am coming to see you in a few weeks .  Thank you in advance ! I greatly appreciate it   Tanna Anthony

## 2019-10-31 ENCOUNTER — TELEPHONE (OUTPATIENT)
Dept: ORTHOPEDIC SURGERY | Facility: CLINIC | Age: 59
End: 2019-10-31

## 2019-10-31 NOTE — TELEPHONE ENCOUNTER
Can you please call patient let her know I be happy to see her next week if possible if her insurance allows her be happy to see her again in January depending on insurance.  Unfortunate my schedule is completely full today and I cannot see her today.  Please let me know after you speak with her

## 2019-10-31 NOTE — TELEPHONE ENCOUNTER
Regarding: Non-Urgent Medical Question  Contact: [phone]  ----- Message from Mychart, Generic sent at 10/31/2019  2:05 AM EDT -----    Sorry I missed the appointment today   I had it on my calendar as tomorrow.  I am losing my health insurance at the end of this month and will not have any until January  and them I don’t know if it will cover your office. So I don’t need to reschedule. I guess  I will just be in pain !  Tanna Anthony

## 2019-10-31 NOTE — TELEPHONE ENCOUNTER
Call returned to the patient.  I was unable to reach her but I did leave a message on her answering machine for her to contact me the office

## 2020-07-06 ENCOUNTER — TELEPHONE (OUTPATIENT)
Dept: ORTHOPEDIC SURGERY | Facility: CLINIC | Age: 60
End: 2020-07-06

## 2020-07-06 NOTE — TELEPHONE ENCOUNTER
----- Message from Tanna Anthony sent at 7/3/2020  4:42 AM EDT -----  Regarding: Non-Urgent Medical Question  Contact: [phone]  Hi. I need to come check on partial knee implant in my right knee. I can barely walk on it.  I have fallen directly on it several times. I just need an apt on a Monday or Thursday after 1

## 2020-08-25 ENCOUNTER — TELEPHONE (OUTPATIENT)
Dept: ORTHOPEDIC SURGERY | Facility: CLINIC | Age: 60
End: 2020-08-25

## 2020-09-29 ENCOUNTER — TELEPHONE (OUTPATIENT)
Dept: ORTHOPEDIC SURGERY | Facility: CLINIC | Age: 60
End: 2020-09-29

## 2020-09-29 NOTE — TELEPHONE ENCOUNTER
----- Message from Tanna Anthony sent at 9/29/2020  1:13 AM EDT -----  Regarding: Non-Urgent Medical Question  Contact: [phone]  Hi I really need this to go to Dr Correa or Dr Persaud. I need An appointment I think I did something to my knee implant on my right knee Dr correa put in   Can you all please forward

## 2021-03-02 ENCOUNTER — OFFICE VISIT (OUTPATIENT)
Dept: ORTHOPEDIC SURGERY | Facility: CLINIC | Age: 61
End: 2021-03-02

## 2021-03-02 VITALS — WEIGHT: 145 LBS | TEMPERATURE: 98.4 F | HEIGHT: 60 IN | BODY MASS INDEX: 28.47 KG/M2

## 2021-03-02 DIAGNOSIS — R52 PAIN: Primary | ICD-10-CM

## 2021-03-02 DIAGNOSIS — G89.29 CHRONIC PAIN OF RIGHT KNEE: ICD-10-CM

## 2021-03-02 DIAGNOSIS — M25.561 CHRONIC PAIN OF RIGHT KNEE: ICD-10-CM

## 2021-03-02 PROCEDURE — 73562 X-RAY EXAM OF KNEE 3: CPT | Performed by: NURSE PRACTITIONER

## 2021-03-02 PROCEDURE — 99214 OFFICE O/P EST MOD 30 MIN: CPT | Performed by: NURSE PRACTITIONER

## 2021-03-02 RX ORDER — MELOXICAM 7.5 MG/1
15 TABLET ORAL ONCE
Status: CANCELLED | OUTPATIENT
Start: 2021-03-02 | End: 2021-03-02

## 2021-03-02 RX ORDER — PREGABALIN 150 MG/1
150 CAPSULE ORAL ONCE
Status: CANCELLED | OUTPATIENT
Start: 2021-03-02 | End: 2021-03-02

## 2021-03-02 NOTE — PROGRESS NOTES
Patient: Tanna Anthony  YOB: 1960 60 y.o. female  Medical Record Number: [id number]    Chief Complaints:   Chief Complaint   Patient presents with   • Left Knee - Follow-up, Pain   • Right Knee - Follow-up, Pain       History of Present Illness:Tanna Anthony is a 60 y.o. female who presents with complaints of bilateral knee pain right greater than left.  The patient had previous right unicompartmental replacement 4 years ago left unicompartmental replacement 3 years ago, she has been doing great for quite some time but started about 4 to 5 months ago with increasing pain right greater than left.  She now describes it as a moderate to severe sharp type pain worse with any standing walking, only slightly better with rest.    Allergies:   Allergies   Allergen Reactions   • Adhesive Tape Itching   • Codeine Itching   • Flu Virus Vaccine Nausea Only and Other (See Comments)     Severe myalgias, fevery, FLIP       Medications:   Current Outpatient Medications   Medication Sig Dispense Refill   • buPROPion SR (WELLBUTRIN SR) 150 MG 12 hr tablet Take 150 mg by mouth Daily.     • pravastatin (PRAVACHOL) 10 MG tablet Take 10 mg by mouth Every Night.     • ALLEGRA-D ALLERGY & CONGESTION 180-240 MG per 24 hr tablet Take 1 tablet by mouth Daily As Needed.  3   • CALCIUM CARBONATE PO Take 1,000 mg by mouth Daily.     • diclofenac (VOLTAREN) 1 % gel gel Apply 4 g topically to the appropriate area as directed 4 (Four) Times a Day As Needed (pain). 100 g 2   • fluticasone (FLONASE) 50 MCG/ACT nasal spray 2 sprays into the nostril(s) as directed by provider Daily.     • pantoprazole (PROTONIX) 40 MG EC tablet Take 40 mg by mouth every morning.  1   • traMADol (ULTRAM) 50 MG tablet Take 1 tablet by mouth Every 4 (Four) Hours As Needed for Moderate Pain . 60 tablet 0     No current facility-administered medications for this visit.          The following portions of the patient's history were reviewed and  "updated as appropriate: allergies, current medications, past family history, past medical history, past social history, past surgical history and problem list.    Review of Systems:   A 14 point review of systems was performed. All systems negative except pertinent positives/negative listed in HPI above    Physical Exam:   Vitals:    03/02/21 1322   Temp: 98.4 °F (36.9 °C)   Weight: 65.8 kg (145 lb)   Height: 152.4 cm (60\")   PainSc:   6       General: A and O x 3, ASA, NAD    SCLERA:    Normal    DENTITION:   Normal  Skin clear no unusual lesions noted  Bilateral knees patient is well-healed surgical incisions noted 120 degrees flexion neutral extension calf soft nontender       Radiology:  Xrays 3views (ap,lateral, sunrise) bilateral knees were ordered and reviewed today secondary to pain show previously replaced bilateral unicompartmental replacements, she does have obvious signs of lucencies and what appears to be depression bilateral knees medial aspect right greater than left.  Compared to views show changes listed previously    Assessment/Plan: Painful bilateral unicompartmental replacements right greater than left    Dr. Correa saw the patient as well, we will proceed with right knee revision from unicompartmental replacement to total knee replacement  Continuation of conservative management vs. TKA discussed.  The patient wishes to proceed with total knee replacement.  At this point the patient has failed the full compliment of conservative treatment and stating complete understanding of the risks/benefits/ anternatives wishes to proceed with surgical treatment.    Risk and benefits of surgery were reviewed.  Including, but not limited to, blood clots or pulmonary embolism, anesthesia risk, infection, fracture, skin/leg numbness, persistent pain/crepitance/popping/catching, failure of the implant, need for future surgeries, hematoma, possible nerve or blood vessel injury, need for transfusion, and potential " risk of stroke,heart attack or death, among others.  The patient understands and wishes to proceed.     It was explained that if tissue has been repaired or reconstructed, there is also an increased chance of failure which may require further management.  Following the completion of the discussion, the patient expressed understanding of this planned course of care, all their questions were answered and consent will be obtained preoperatively.    Operative Plan: Smith and Nephbenigno Oxinium Total Knee Replacement an overnight staywith home health rehab        Roshni Alfaro, APRN  3/2/2021

## 2021-03-16 ENCOUNTER — BULK ORDERING (OUTPATIENT)
Dept: CASE MANAGEMENT | Facility: OTHER | Age: 61
End: 2021-03-16

## 2021-03-16 DIAGNOSIS — Z23 IMMUNIZATION DUE: ICD-10-CM

## 2021-06-29 ENCOUNTER — TELEPHONE (OUTPATIENT)
Dept: ORTHOPEDIC SURGERY | Facility: CLINIC | Age: 61
End: 2021-06-29

## 2021-06-29 NOTE — TELEPHONE ENCOUNTER
----- Message from Lanre Ray MD sent at 6/29/2021  8:31 AM EDT -----  Can you please call her to make her an appointment to see me next week or the week after?  Thanks.

## 2022-01-03 ENCOUNTER — TELEPHONE (OUTPATIENT)
Dept: ORTHOPEDIC SURGERY | Facility: CLINIC | Age: 62
End: 2022-01-03

## 2023-01-09 NOTE — TELEPHONE ENCOUNTER
----- Message from Yeny Anthony sent at 5/22/2018 10:33 AM EDT -----  Regarding: Non-Urgent Medical Question  Contact: [phone]  I am so sorry.  I was called to an emergency with my father in [facility] over this past 6 weeks. I am sure I missed an appointment    My right foot is really hurting. I have [facility] emergency [facility] and have a different type schedule. I can now do early morning appointments most days.   Again I apologize for now being there.   Sincerely   Tanna          Estimated Blood Loss (Cc): minimal

## 2024-05-24 NOTE — ANESTHESIA PREPROCEDURE EVALUATION
Anesthesia Evaluation     Patient summary reviewed and Nursing notes reviewed   history of anesthetic complications (spinal HA):  NPO Solid Status: > 8 hours  NPO Liquid Status: > 2 hours     Airway   Mallampati: II  TM distance: >3 FB  Neck ROM: full  no difficulty expected  Dental - normal exam     Pulmonary - normal exam   (+) sleep apnea on CPAP,   Cardiovascular - normal exam  Exercise tolerance: good (4-7 METS)    ECG reviewed  Rhythm: regular  Rate: normal    (+) hyperlipidemia      Neuro/Psych  (+) headaches, psychiatric history Depression,    GI/Hepatic/Renal/Endo    (+)  GERD, hyperthyroidism    Musculoskeletal     Abdominal  - normal exam   Substance History - negative use     OB/GYN          Other   (+) arthritis                                     Anesthesia Plan    ASA 3     general and regional     intravenous induction   Anesthetic plan and risks discussed with patient.      
Unknown

## (undated) DEVICE — RECIPROCATING BLADE, DOUBLE SIDED, OFFSET  (70.0 X 0.64 X 12.6MM)

## (undated) DEVICE — UNDERCAST PADDING: Brand: DEROYAL

## (undated) DEVICE — SUT VIC 0 CT1 36IN J946H

## (undated) DEVICE — PK KN TOTL 40

## (undated) DEVICE — PREMIUM WET SKIN PREP TRAY: Brand: MEDLINE INDUSTRIES, INC.

## (undated) DEVICE — APPL DURAPREP IODOPHOR APL 26ML

## (undated) DEVICE — ENCORE® LATEX ORTHO SIZE 7.5, STERILE LATEX POWDER-FREE SURGICAL GLOVE: Brand: ENCORE

## (undated) DEVICE — GLV SURG SENSICARE MICRO PF LF 7 STRL

## (undated) DEVICE — 3M™ IOBAN™ 2 ANTIMICROBIAL INCISE DRAPE 6640EZ: Brand: IOBAN™ 2

## (undated) DEVICE — SPNG GZ WOVN 4X4IN 12PLY 10/BX STRL

## (undated) DEVICE — SUT VIC 1 CT1 36IN J947H

## (undated) DEVICE — BNDG ELAS CO-FLEX SLF ADHR 4IN5YD LF STRL

## (undated) DEVICE — STPLR SKIN VISISTAT WD 35CT

## (undated) DEVICE — DRSNG WND GZ CURAD OIL EMULSION 3X8IN LF STRL 1PK

## (undated) DEVICE — RECIPROCATING BLADE HEAVY DUTY LONG, OFFSET  (77.6 X 0.77 X 11.2MM)

## (undated) DEVICE — 3M™ IOBAN™ 2 ANTIMICROBIAL INCISE DRAPE 6650EZ: Brand: IOBAN™ 2

## (undated) DEVICE — GLV SURG SENSICARE GREEN W/ALOE PF LF 7 STRL

## (undated) DEVICE — MEDI-VAC YANKAUER SUCTION HANDLE W/BULBOUS TIP: Brand: CARDINAL HEALTH

## (undated) DEVICE — NDL SPINE 22G 31/2IN BLK

## (undated) DEVICE — GLV SURG SENSICARE W/ALOE PF LF 7.5 STRL

## (undated) DEVICE — GLV SURG SENSICARE W/ALOE PF LF 8 STRL

## (undated) DEVICE — DRSNG ADAPTIC 3X16

## (undated) DEVICE — 2108 SERIES SAGITTAL BLADE, NO OFFSET  (12.4 X 1.19 X 82.1MM)

## (undated) DEVICE — MAT FLR ABSORBENT LG 4FT 10 2.5FT